# Patient Record
Sex: MALE | Race: WHITE | NOT HISPANIC OR LATINO | Employment: OTHER | URBAN - METROPOLITAN AREA
[De-identification: names, ages, dates, MRNs, and addresses within clinical notes are randomized per-mention and may not be internally consistent; named-entity substitution may affect disease eponyms.]

---

## 2017-01-28 ENCOUNTER — COMMUNICATION - HEALTHEAST (OUTPATIENT)
Dept: INTERNAL MEDICINE | Facility: CLINIC | Age: 67
End: 2017-01-28

## 2017-01-28 DIAGNOSIS — I10 HYPERTENSION: ICD-10-CM

## 2017-05-30 ENCOUNTER — OFFICE VISIT - HEALTHEAST (OUTPATIENT)
Dept: INTERNAL MEDICINE | Facility: CLINIC | Age: 67
End: 2017-05-30

## 2017-05-30 DIAGNOSIS — I10 HYPERTENSION: ICD-10-CM

## 2017-05-30 DIAGNOSIS — I10 ESSENTIAL HYPERTENSION: ICD-10-CM

## 2017-05-30 DIAGNOSIS — R73.03 PREDIABETES: ICD-10-CM

## 2017-05-30 DIAGNOSIS — E78.5 HLD (HYPERLIPIDEMIA): ICD-10-CM

## 2017-05-30 LAB
CHOLEST SERPL-MCNC: 110 MG/DL
FASTING STATUS PATIENT QL REPORTED: YES
HBA1C MFR BLD: 5.9 % (ref 3.5–6)
HDLC SERPL-MCNC: 35 MG/DL
LDLC SERPL CALC-MCNC: 47 MG/DL
TRIGL SERPL-MCNC: 138 MG/DL

## 2017-05-31 ENCOUNTER — COMMUNICATION - HEALTHEAST (OUTPATIENT)
Dept: INTERNAL MEDICINE | Facility: CLINIC | Age: 67
End: 2017-05-31

## 2017-06-02 ENCOUNTER — COMMUNICATION - HEALTHEAST (OUTPATIENT)
Dept: INTERNAL MEDICINE | Facility: CLINIC | Age: 67
End: 2017-06-02

## 2017-06-02 DIAGNOSIS — I10 HTN (HYPERTENSION): ICD-10-CM

## 2017-10-16 ENCOUNTER — AMBULATORY - HEALTHEAST (OUTPATIENT)
Dept: NURSING | Facility: CLINIC | Age: 67
End: 2017-10-16

## 2017-10-16 DIAGNOSIS — Z00.00 HEALTH CARE MAINTENANCE: ICD-10-CM

## 2018-04-03 ENCOUNTER — COMMUNICATION - HEALTHEAST (OUTPATIENT)
Dept: INTERNAL MEDICINE | Facility: CLINIC | Age: 68
End: 2018-04-03

## 2018-04-24 ENCOUNTER — OFFICE VISIT - HEALTHEAST (OUTPATIENT)
Dept: INTERNAL MEDICINE | Facility: CLINIC | Age: 68
End: 2018-04-24

## 2018-04-24 DIAGNOSIS — I10 HYPERTENSION: ICD-10-CM

## 2018-04-24 DIAGNOSIS — E78.5 HLD (HYPERLIPIDEMIA): ICD-10-CM

## 2018-04-24 DIAGNOSIS — R73.03 PREDIABETES: ICD-10-CM

## 2018-04-24 DIAGNOSIS — I10 HTN (HYPERTENSION): ICD-10-CM

## 2018-04-24 DIAGNOSIS — Z12.11 COLON CANCER SCREENING: ICD-10-CM

## 2018-04-24 DIAGNOSIS — I10 ESSENTIAL HYPERTENSION: ICD-10-CM

## 2018-04-24 LAB
ALBUMIN SERPL-MCNC: 3.7 G/DL (ref 3.5–5)
ALP SERPL-CCNC: 85 U/L (ref 45–120)
ALT SERPL W P-5'-P-CCNC: 34 U/L (ref 0–45)
ANION GAP SERPL CALCULATED.3IONS-SCNC: 13 MMOL/L (ref 5–18)
AST SERPL W P-5'-P-CCNC: 27 U/L (ref 0–40)
BILIRUB SERPL-MCNC: 1.4 MG/DL (ref 0–1)
BUN SERPL-MCNC: 15 MG/DL (ref 8–22)
CALCIUM SERPL-MCNC: 9.6 MG/DL (ref 8.5–10.5)
CHLORIDE BLD-SCNC: 103 MMOL/L (ref 98–107)
CHOLEST SERPL-MCNC: 88 MG/DL
CO2 SERPL-SCNC: 23 MMOL/L (ref 22–31)
CREAT SERPL-MCNC: 0.88 MG/DL (ref 0.7–1.3)
FASTING STATUS PATIENT QL REPORTED: ABNORMAL
GFR SERPL CREATININE-BSD FRML MDRD: >60 ML/MIN/1.73M2
GLUCOSE BLD-MCNC: 98 MG/DL (ref 70–125)
HBA1C MFR BLD: 6.2 % (ref 3.5–6)
HDLC SERPL-MCNC: 33 MG/DL
LDLC SERPL CALC-MCNC: 34 MG/DL
POTASSIUM BLD-SCNC: 4.3 MMOL/L (ref 3.5–5)
PROT SERPL-MCNC: 7.6 G/DL (ref 6–8)
SODIUM SERPL-SCNC: 139 MMOL/L (ref 136–145)
TRIGL SERPL-MCNC: 104 MG/DL

## 2018-04-25 ENCOUNTER — COMMUNICATION - HEALTHEAST (OUTPATIENT)
Dept: INTERNAL MEDICINE | Facility: CLINIC | Age: 68
End: 2018-04-25

## 2018-04-25 ENCOUNTER — AMBULATORY - HEALTHEAST (OUTPATIENT)
Dept: INTERNAL MEDICINE | Facility: CLINIC | Age: 68
End: 2018-04-25

## 2018-05-04 ENCOUNTER — AMBULATORY - HEALTHEAST (OUTPATIENT)
Dept: LAB | Facility: CLINIC | Age: 68
End: 2018-05-04

## 2018-05-04 DIAGNOSIS — Z12.11 COLON CANCER SCREENING: ICD-10-CM

## 2018-05-04 LAB
HEMOCCULT SP1 STL QL: NEGATIVE
HEMOCCULT SP2 STL QL: NEGATIVE
HEMOCCULT SP3 STL QL: NEGATIVE

## 2018-05-07 ENCOUNTER — COMMUNICATION - HEALTHEAST (OUTPATIENT)
Dept: INTERNAL MEDICINE | Facility: CLINIC | Age: 68
End: 2018-05-07

## 2018-05-18 ENCOUNTER — COMMUNICATION - HEALTHEAST (OUTPATIENT)
Dept: LAB | Facility: CLINIC | Age: 68
End: 2018-05-18

## 2018-11-08 ENCOUNTER — AMBULATORY - HEALTHEAST (OUTPATIENT)
Dept: NURSING | Facility: CLINIC | Age: 68
End: 2018-11-08

## 2018-11-08 ENCOUNTER — AMBULATORY - HEALTHEAST (OUTPATIENT)
Dept: INTERNAL MEDICINE | Facility: CLINIC | Age: 68
End: 2018-11-08

## 2018-11-08 DIAGNOSIS — Z23 FLU VACCINE NEED: ICD-10-CM

## 2019-04-18 ENCOUNTER — COMMUNICATION - HEALTHEAST (OUTPATIENT)
Dept: INTERNAL MEDICINE | Facility: CLINIC | Age: 69
End: 2019-04-18

## 2019-04-18 DIAGNOSIS — I10 ESSENTIAL HYPERTENSION: ICD-10-CM

## 2019-05-09 ENCOUNTER — COMMUNICATION - HEALTHEAST (OUTPATIENT)
Dept: INTERNAL MEDICINE | Facility: CLINIC | Age: 69
End: 2019-05-09

## 2019-05-09 DIAGNOSIS — E78.2 MIXED HYPERLIPIDEMIA: ICD-10-CM

## 2019-05-16 ENCOUNTER — OFFICE VISIT - HEALTHEAST (OUTPATIENT)
Dept: INTERNAL MEDICINE | Facility: CLINIC | Age: 69
End: 2019-05-16

## 2019-05-16 ENCOUNTER — COMMUNICATION - HEALTHEAST (OUTPATIENT)
Dept: INTERNAL MEDICINE | Facility: CLINIC | Age: 69
End: 2019-05-16

## 2019-05-16 DIAGNOSIS — R73.01 IMPAIRED FASTING GLUCOSE: ICD-10-CM

## 2019-05-16 DIAGNOSIS — E78.2 MIXED HYPERLIPIDEMIA: ICD-10-CM

## 2019-05-16 DIAGNOSIS — I10 ESSENTIAL HYPERTENSION: ICD-10-CM

## 2019-05-16 DIAGNOSIS — Z12.11 SCREEN FOR COLON CANCER: ICD-10-CM

## 2019-05-16 LAB
ALBUMIN SERPL-MCNC: 4 G/DL (ref 3.5–5)
ALP SERPL-CCNC: 84 U/L (ref 45–120)
ALT SERPL W P-5'-P-CCNC: 24 U/L (ref 0–45)
ANION GAP SERPL CALCULATED.3IONS-SCNC: 13 MMOL/L (ref 5–18)
AST SERPL W P-5'-P-CCNC: 23 U/L (ref 0–40)
BILIRUB SERPL-MCNC: 1.6 MG/DL (ref 0–1)
BUN SERPL-MCNC: 14 MG/DL (ref 8–22)
CALCIUM SERPL-MCNC: 9.8 MG/DL (ref 8.5–10.5)
CHLORIDE BLD-SCNC: 103 MMOL/L (ref 98–107)
CHOLEST SERPL-MCNC: 117 MG/DL
CO2 SERPL-SCNC: 24 MMOL/L (ref 22–31)
CREAT SERPL-MCNC: 1.02 MG/DL (ref 0.7–1.3)
FASTING STATUS PATIENT QL REPORTED: YES
GFR SERPL CREATININE-BSD FRML MDRD: >60 ML/MIN/1.73M2
GLUCOSE BLD-MCNC: 110 MG/DL (ref 70–125)
HBA1C MFR BLD: 6.1 % (ref 3.5–6)
HDLC SERPL-MCNC: 40 MG/DL
LDLC SERPL CALC-MCNC: 51 MG/DL
POTASSIUM BLD-SCNC: 4.3 MMOL/L (ref 3.5–5)
PROT SERPL-MCNC: 7.8 G/DL (ref 6–8)
SODIUM SERPL-SCNC: 140 MMOL/L (ref 136–145)
TRIGL SERPL-MCNC: 132 MG/DL

## 2019-05-16 ASSESSMENT — MIFFLIN-ST. JEOR: SCORE: 1648.04

## 2019-05-20 ENCOUNTER — AMBULATORY - HEALTHEAST (OUTPATIENT)
Dept: LAB | Facility: CLINIC | Age: 69
End: 2019-05-20

## 2019-05-20 DIAGNOSIS — Z12.11 SCREEN FOR COLON CANCER: ICD-10-CM

## 2019-05-20 LAB
HEMOCCULT SP1 STL QL: NEGATIVE
HEMOCCULT SP2 STL QL: NEGATIVE
HEMOCCULT SP3 STL QL: NEGATIVE

## 2019-05-21 ENCOUNTER — COMMUNICATION - HEALTHEAST (OUTPATIENT)
Dept: INTERNAL MEDICINE | Facility: CLINIC | Age: 69
End: 2019-05-21

## 2019-08-19 ENCOUNTER — OFFICE VISIT - HEALTHEAST (OUTPATIENT)
Dept: FAMILY MEDICINE | Facility: CLINIC | Age: 69
End: 2019-08-19

## 2019-08-19 DIAGNOSIS — R35.0 URINARY FREQUENCY: ICD-10-CM

## 2019-08-19 DIAGNOSIS — D72.829 LEUKOCYTOSIS, UNSPECIFIED TYPE: ICD-10-CM

## 2019-08-19 DIAGNOSIS — R73.03 PREDIABETES: ICD-10-CM

## 2019-08-19 DIAGNOSIS — R05.9 COUGH: ICD-10-CM

## 2019-08-19 DIAGNOSIS — R53.83 FATIGUE, UNSPECIFIED TYPE: ICD-10-CM

## 2019-08-19 LAB
ALBUMIN UR-MCNC: NEGATIVE MG/DL
ANION GAP SERPL CALCULATED.3IONS-SCNC: 11 MMOL/L (ref 5–18)
APPEARANCE UR: CLEAR
BASOPHILS # BLD AUTO: 0.1 THOU/UL (ref 0–0.2)
BASOPHILS NFR BLD AUTO: 1 % (ref 0–2)
BILIRUB UR QL STRIP: NEGATIVE
BUN SERPL-MCNC: 21 MG/DL (ref 8–22)
CHLORIDE BLD-SCNC: 100 MMOL/L (ref 98–107)
CO2 SERPL-SCNC: 26 MMOL/L (ref 22–31)
COLOR UR AUTO: YELLOW
CREAT SERPL-MCNC: 1.1 MG/DL (ref 0.8–1.5)
EOSINOPHIL # BLD AUTO: 0.7 THOU/UL (ref 0–0.4)
EOSINOPHIL NFR BLD AUTO: 5 % (ref 0–6)
ERYTHROCYTE [DISTWIDTH] IN BLOOD BY AUTOMATED COUNT: 10.6 % (ref 11–14.5)
GLUCOSE BLD-MCNC: 164 MG/DL (ref 70–125)
GLUCOSE UR STRIP-MCNC: NEGATIVE MG/DL
HBA1C MFR BLD: 6.2 % (ref 3.5–6)
HCT VFR BLD AUTO: 39.4 % (ref 40–54)
HGB BLD-MCNC: 13.4 G/DL (ref 14–18)
HGB UR QL STRIP: NEGATIVE
KETONES UR STRIP-MCNC: NEGATIVE MG/DL
LEUKOCYTE ESTERASE UR QL STRIP: NEGATIVE
LYMPHOCYTES # BLD AUTO: 2.4 THOU/UL (ref 0.8–4.4)
LYMPHOCYTES NFR BLD AUTO: 17 % (ref 20–40)
MCH RBC QN AUTO: 34.2 PG (ref 27–34)
MCHC RBC AUTO-ENTMCNC: 33.9 G/DL (ref 32–36)
MCV RBC AUTO: 101 FL (ref 80–100)
MONOCYTES # BLD AUTO: 1.3 THOU/UL (ref 0–0.9)
MONOCYTES NFR BLD AUTO: 9 % (ref 2–10)
NEUTROPHILS # BLD AUTO: 9.5 THOU/UL (ref 2–7.7)
NEUTROPHILS NFR BLD AUTO: 68 % (ref 50–70)
NITRATE UR QL: NEGATIVE
PH UR STRIP: 5.5 [PH] (ref 5–8)
PLATELET # BLD AUTO: 614 THOU/UL (ref 140–440)
PMV BLD AUTO: 7.7 FL (ref 7–10)
POTASSIUM BLD-SCNC: 4.4 MMOL/L (ref 3.5–5.5)
RBC # BLD AUTO: 3.91 MILL/UL (ref 4.4–6.2)
SODIUM SERPL-SCNC: 137 MMOL/L (ref 136–145)
SP GR UR STRIP: 1.02 (ref 1–1.03)
UROBILINOGEN UR STRIP-ACNC: NORMAL
WBC: 14 THOU/UL (ref 4–11)

## 2019-08-21 ENCOUNTER — OFFICE VISIT - HEALTHEAST (OUTPATIENT)
Dept: INTERNAL MEDICINE | Facility: CLINIC | Age: 69
End: 2019-08-21

## 2019-08-21 DIAGNOSIS — R53.83 FATIGUE, UNSPECIFIED TYPE: ICD-10-CM

## 2019-08-21 DIAGNOSIS — R05.9 COUGH: ICD-10-CM

## 2019-08-21 DIAGNOSIS — D72.829 LEUKOCYTOSIS, UNSPECIFIED TYPE: ICD-10-CM

## 2019-08-21 LAB
BASOPHILS # BLD AUTO: 0.1 THOU/UL (ref 0–0.2)
BASOPHILS NFR BLD AUTO: 1 % (ref 0–2)
EOSINOPHIL # BLD AUTO: 0.6 THOU/UL (ref 0–0.4)
EOSINOPHIL NFR BLD AUTO: 5 % (ref 0–6)
ERYTHROCYTE [DISTWIDTH] IN BLOOD BY AUTOMATED COUNT: 10.6 % (ref 11–14.5)
FOLATE SERPL-MCNC: 16.1 NG/ML
HCT VFR BLD AUTO: 38.7 % (ref 40–54)
HGB BLD-MCNC: 13.1 G/DL (ref 14–18)
LYMPHOCYTES # BLD AUTO: 3.2 THOU/UL (ref 0.8–4.4)
LYMPHOCYTES NFR BLD AUTO: 25 % (ref 20–40)
MCH RBC QN AUTO: 34 PG (ref 27–34)
MCHC RBC AUTO-ENTMCNC: 33.7 G/DL (ref 32–36)
MCV RBC AUTO: 101 FL (ref 80–100)
MONOCYTES # BLD AUTO: 1.1 THOU/UL (ref 0–0.9)
MONOCYTES NFR BLD AUTO: 9 % (ref 2–10)
NEUTROPHILS # BLD AUTO: 7.6 THOU/UL (ref 2–7.7)
NEUTROPHILS NFR BLD AUTO: 61 % (ref 50–70)
PLATELET # BLD AUTO: 685 THOU/UL (ref 140–440)
PMV BLD AUTO: 7 FL (ref 7–10)
RBC # BLD AUTO: 3.84 MILL/UL (ref 4.4–6.2)
VIT B12 SERPL-MCNC: 761 PG/ML (ref 213–816)
WBC: 12.5 THOU/UL (ref 4–11)

## 2019-08-22 ENCOUNTER — AMBULATORY - HEALTHEAST (OUTPATIENT)
Dept: INTERNAL MEDICINE | Facility: CLINIC | Age: 69
End: 2019-08-22

## 2019-08-22 DIAGNOSIS — D75.839 THROMBOCYTOSIS: ICD-10-CM

## 2019-08-22 DIAGNOSIS — D64.9 ANEMIA, UNSPECIFIED TYPE: ICD-10-CM

## 2019-08-28 ENCOUNTER — COMMUNICATION - HEALTHEAST (OUTPATIENT)
Dept: INTERNAL MEDICINE | Facility: CLINIC | Age: 69
End: 2019-08-28

## 2019-09-05 ENCOUNTER — AMBULATORY - HEALTHEAST (OUTPATIENT)
Dept: LAB | Facility: CLINIC | Age: 69
End: 2019-09-05

## 2019-09-05 DIAGNOSIS — D72.829 LEUKOCYTOSIS, UNSPECIFIED TYPE: ICD-10-CM

## 2019-09-05 DIAGNOSIS — D64.9 ANEMIA, UNSPECIFIED TYPE: ICD-10-CM

## 2019-09-05 DIAGNOSIS — D75.839 THROMBOCYTOSIS: ICD-10-CM

## 2019-09-05 LAB
BASOPHILS # BLD AUTO: 0.1 THOU/UL (ref 0–0.2)
BASOPHILS NFR BLD AUTO: 1 % (ref 0–2)
EOSINOPHIL # BLD AUTO: 0.3 THOU/UL (ref 0–0.4)
EOSINOPHIL NFR BLD AUTO: 3 % (ref 0–6)
ERYTHROCYTE [DISTWIDTH] IN BLOOD BY AUTOMATED COUNT: 13 % (ref 11–14.5)
HCT VFR BLD AUTO: 41.7 % (ref 40–54)
HGB BLD-MCNC: 13.3 G/DL (ref 14–18)
LYMPHOCYTES # BLD AUTO: 2.9 THOU/UL (ref 0.8–4.4)
LYMPHOCYTES NFR BLD AUTO: 24 % (ref 20–40)
MCH RBC QN AUTO: 33.3 PG (ref 27–34)
MCHC RBC AUTO-ENTMCNC: 31.9 G/DL (ref 32–36)
MCV RBC AUTO: 105 FL (ref 80–100)
MONOCYTES # BLD AUTO: 1.2 THOU/UL (ref 0–0.9)
MONOCYTES NFR BLD AUTO: 10 % (ref 2–10)
NEUTROPHILS # BLD AUTO: 7.2 THOU/UL (ref 2–7.7)
NEUTROPHILS NFR BLD AUTO: 61 % (ref 50–70)
PLATELET # BLD AUTO: 469 THOU/UL (ref 140–440)
PMV BLD AUTO: 10.5 FL (ref 8.5–12.5)
RBC # BLD AUTO: 3.99 MILL/UL (ref 4.4–6.2)
WBC: 11.7 THOU/UL (ref 4–11)

## 2019-09-06 LAB
BASOPHILS # BLD AUTO: 0.1 THOU/UL (ref 0–0.2)
BASOPHILS NFR BLD AUTO: 1 % (ref 0–2)
EOSINOPHIL # BLD AUTO: 0.3 THOU/UL (ref 0–0.4)
EOSINOPHIL NFR BLD AUTO: 3 % (ref 0–6)
ERYTHROCYTE [DISTWIDTH] IN BLOOD BY AUTOMATED COUNT: 12.9 % (ref 11–14.5)
HCT VFR BLD AUTO: 41.6 % (ref 40–54)
HGB BLD-MCNC: 13.4 G/DL (ref 14–18)
LAB AP CHARGES (HE HISTORICAL CONVERSION): NORMAL
LYMPHOCYTES # BLD AUTO: 2.8 THOU/UL (ref 0.8–4.4)
LYMPHOCYTES NFR BLD AUTO: 25 % (ref 20–40)
MCH RBC QN AUTO: 33.7 PG (ref 27–34)
MCHC RBC AUTO-ENTMCNC: 32.2 G/DL (ref 32–36)
MCV RBC AUTO: 105 FL (ref 80–100)
MONOCYTES # BLD AUTO: 1.1 THOU/UL (ref 0–0.9)
MONOCYTES NFR BLD AUTO: 10 % (ref 2–10)
NEUTROPHILS # BLD AUTO: 7.1 THOU/UL (ref 2–7.7)
NEUTROPHILS NFR BLD AUTO: 62 % (ref 50–70)
PATH REPORT.COMMENTS IMP SPEC: NORMAL
PATH REPORT.COMMENTS IMP SPEC: NORMAL
PATH REPORT.FINAL DX SPEC: NORMAL
PATH REPORT.MICROSCOPIC SPEC OTHER STN: ABNORMAL
PATH REPORT.MICROSCOPIC SPEC OTHER STN: NORMAL
PATH REPORT.RELEVANT HX SPEC: NORMAL
PLATELET # BLD AUTO: 474 THOU/UL (ref 140–440)
PMV BLD AUTO: 10.4 FL (ref 8.5–12.5)
RBC # BLD AUTO: 3.98 MILL/UL (ref 4.4–6.2)
WBC: 11.4 THOU/UL (ref 4–11)

## 2019-09-11 ENCOUNTER — COMMUNICATION - HEALTHEAST (OUTPATIENT)
Dept: INTERNAL MEDICINE | Facility: CLINIC | Age: 69
End: 2019-09-11

## 2019-10-09 ENCOUNTER — AMBULATORY - HEALTHEAST (OUTPATIENT)
Dept: INFUSION THERAPY | Facility: HOSPITAL | Age: 69
End: 2019-10-09

## 2019-10-09 ENCOUNTER — OFFICE VISIT - HEALTHEAST (OUTPATIENT)
Dept: ONCOLOGY | Facility: HOSPITAL | Age: 69
End: 2019-10-09

## 2019-10-09 DIAGNOSIS — D75.839 THROMBOCYTOSIS: ICD-10-CM

## 2019-10-09 DIAGNOSIS — D64.9 ANEMIA, UNSPECIFIED TYPE: ICD-10-CM

## 2019-10-09 DIAGNOSIS — D72.821 MONOCYTOSIS: ICD-10-CM

## 2019-10-09 DIAGNOSIS — D72.829 LEUKOCYTOSIS, UNSPECIFIED TYPE: ICD-10-CM

## 2019-10-09 LAB
ALBUMIN SERPL-MCNC: 3.9 G/DL (ref 3.5–5)
ALP SERPL-CCNC: 78 U/L (ref 45–120)
ALT SERPL W P-5'-P-CCNC: 21 U/L (ref 0–45)
ANION GAP SERPL CALCULATED.3IONS-SCNC: 9 MMOL/L (ref 5–18)
AST SERPL W P-5'-P-CCNC: 23 U/L (ref 0–40)
BASOPHILS # BLD AUTO: 0.1 THOU/UL (ref 0–0.2)
BASOPHILS NFR BLD AUTO: 1 % (ref 0–2)
BILIRUB SERPL-MCNC: 1.2 MG/DL (ref 0–1)
BUN SERPL-MCNC: 13 MG/DL (ref 8–22)
CALCIUM SERPL-MCNC: 9.8 MG/DL (ref 8.5–10.5)
CHLORIDE BLD-SCNC: 102 MMOL/L (ref 98–107)
CO2 SERPL-SCNC: 29 MMOL/L (ref 22–31)
CREAT SERPL-MCNC: 1.11 MG/DL (ref 0.7–1.3)
EOSINOPHIL # BLD AUTO: 0.3 THOU/UL (ref 0–0.4)
EOSINOPHIL NFR BLD AUTO: 3 % (ref 0–6)
ERYTHROCYTE [DISTWIDTH] IN BLOOD BY AUTOMATED COUNT: 13.2 % (ref 11–14.5)
GFR SERPL CREATININE-BSD FRML MDRD: >60 ML/MIN/1.73M2
GLUCOSE BLD-MCNC: 95 MG/DL (ref 70–125)
HCT VFR BLD AUTO: 42.2 % (ref 40–54)
HGB BLD-MCNC: 14.1 G/DL (ref 14–18)
LYMPHOCYTES # BLD AUTO: 2.8 THOU/UL (ref 0.8–4.4)
LYMPHOCYTES NFR BLD AUTO: 26 % (ref 20–40)
MCH RBC QN AUTO: 34 PG (ref 27–34)
MCHC RBC AUTO-ENTMCNC: 33.4 G/DL (ref 32–36)
MCV RBC AUTO: 102 FL (ref 80–100)
MONOCYTES # BLD AUTO: 1.1 THOU/UL (ref 0–0.9)
MONOCYTES NFR BLD AUTO: 10 % (ref 2–10)
NEUTROPHILS # BLD AUTO: 6.5 THOU/UL (ref 2–7.7)
NEUTROPHILS NFR BLD AUTO: 60 % (ref 50–70)
PLATELET # BLD AUTO: 436 THOU/UL (ref 140–440)
PMV BLD AUTO: 9.5 FL (ref 8.5–12.5)
POTASSIUM BLD-SCNC: 4.2 MMOL/L (ref 3.5–5)
PROT SERPL-MCNC: 8.2 G/DL (ref 6–8)
RBC # BLD AUTO: 4.15 MILL/UL (ref 4.4–6.2)
SODIUM SERPL-SCNC: 140 MMOL/L (ref 136–145)
T4 FREE SERPL-MCNC: 0.8 NG/DL (ref 0.7–1.8)
TSH SERPL DL<=0.005 MIU/L-ACNC: 1.32 UIU/ML (ref 0.3–5)
WBC: 10.7 THOU/UL (ref 4–11)

## 2019-11-12 ENCOUNTER — AMBULATORY - HEALTHEAST (OUTPATIENT)
Dept: NURSING | Facility: CLINIC | Age: 69
End: 2019-11-12

## 2020-05-10 ENCOUNTER — COMMUNICATION - HEALTHEAST (OUTPATIENT)
Dept: INTERNAL MEDICINE | Facility: CLINIC | Age: 70
End: 2020-05-10

## 2020-05-10 DIAGNOSIS — I10 ESSENTIAL HYPERTENSION: ICD-10-CM

## 2020-07-13 ENCOUNTER — COMMUNICATION - HEALTHEAST (OUTPATIENT)
Dept: INTERNAL MEDICINE | Facility: CLINIC | Age: 70
End: 2020-07-13

## 2020-07-13 DIAGNOSIS — I10 ESSENTIAL HYPERTENSION: ICD-10-CM

## 2020-08-07 ENCOUNTER — COMMUNICATION - HEALTHEAST (OUTPATIENT)
Dept: INTERNAL MEDICINE | Facility: CLINIC | Age: 70
End: 2020-08-07

## 2020-08-07 DIAGNOSIS — I10 ESSENTIAL HYPERTENSION: ICD-10-CM

## 2020-08-07 DIAGNOSIS — E78.2 MIXED HYPERLIPIDEMIA: ICD-10-CM

## 2020-10-09 ENCOUNTER — COMMUNICATION - HEALTHEAST (OUTPATIENT)
Dept: INTERNAL MEDICINE | Facility: CLINIC | Age: 70
End: 2020-10-09

## 2020-10-09 DIAGNOSIS — I10 ESSENTIAL HYPERTENSION: ICD-10-CM

## 2020-10-20 ENCOUNTER — OFFICE VISIT - HEALTHEAST (OUTPATIENT)
Dept: INTERNAL MEDICINE | Facility: CLINIC | Age: 70
End: 2020-10-20

## 2020-10-20 DIAGNOSIS — E66.01 MORBID OBESITY (H): ICD-10-CM

## 2020-10-20 DIAGNOSIS — E78.2 MIXED HYPERLIPIDEMIA: ICD-10-CM

## 2020-10-20 DIAGNOSIS — R73.03 PREDIABETES: ICD-10-CM

## 2020-10-20 DIAGNOSIS — I10 ESSENTIAL HYPERTENSION: ICD-10-CM

## 2020-10-20 DIAGNOSIS — F70 MILD INTELLECTUAL DISABILITIES: ICD-10-CM

## 2020-10-20 LAB
ALT SERPL W P-5'-P-CCNC: 27 U/L (ref 0–45)
ANION GAP SERPL CALCULATED.3IONS-SCNC: 10 MMOL/L (ref 5–18)
BUN SERPL-MCNC: 14 MG/DL (ref 8–28)
CALCIUM SERPL-MCNC: 10 MG/DL (ref 8.5–10.5)
CHLORIDE BLD-SCNC: 102 MMOL/L (ref 98–107)
CHOLEST SERPL-MCNC: 134 MG/DL
CO2 SERPL-SCNC: 27 MMOL/L (ref 22–31)
CREAT SERPL-MCNC: 1.21 MG/DL (ref 0.7–1.3)
ERYTHROCYTE [DISTWIDTH] IN BLOOD BY AUTOMATED COUNT: 10 % (ref 11–14.5)
FASTING STATUS PATIENT QL REPORTED: NO
GFR SERPL CREATININE-BSD FRML MDRD: 59 ML/MIN/1.73M2
GLUCOSE BLD-MCNC: 128 MG/DL (ref 70–125)
HBA1C MFR BLD: 6.1 %
HCT VFR BLD AUTO: 45.2 % (ref 40–54)
HDLC SERPL-MCNC: 45 MG/DL
HGB BLD-MCNC: 15.3 G/DL (ref 14–18)
LDLC SERPL CALC-MCNC: 52 MG/DL
MCH RBC QN AUTO: 34.5 PG (ref 27–34)
MCHC RBC AUTO-ENTMCNC: 33.9 G/DL (ref 32–36)
MCV RBC AUTO: 102 FL (ref 80–100)
PLATELET # BLD AUTO: 368 THOU/UL (ref 140–440)
PMV BLD AUTO: 7.8 FL (ref 7–10)
POTASSIUM BLD-SCNC: 4.7 MMOL/L (ref 3.5–5)
RBC # BLD AUTO: 4.44 MILL/UL (ref 4.4–6.2)
SODIUM SERPL-SCNC: 139 MMOL/L (ref 136–145)
TRIGL SERPL-MCNC: 187 MG/DL
WBC: 11.4 THOU/UL (ref 4–11)

## 2020-10-20 ASSESSMENT — MIFFLIN-ST. JEOR: SCORE: 1657.11

## 2020-10-21 ENCOUNTER — COMMUNICATION - HEALTHEAST (OUTPATIENT)
Dept: INTERNAL MEDICINE | Facility: CLINIC | Age: 70
End: 2020-10-21

## 2020-11-10 ENCOUNTER — COMMUNICATION - HEALTHEAST (OUTPATIENT)
Dept: INTERNAL MEDICINE | Facility: CLINIC | Age: 70
End: 2020-11-10

## 2020-11-10 DIAGNOSIS — I10 ESSENTIAL HYPERTENSION: ICD-10-CM

## 2021-04-22 ENCOUNTER — COMMUNICATION - HEALTHEAST (OUTPATIENT)
Dept: INTERNAL MEDICINE | Facility: CLINIC | Age: 71
End: 2021-04-22

## 2021-04-22 ENCOUNTER — OFFICE VISIT - HEALTHEAST (OUTPATIENT)
Dept: INTERNAL MEDICINE | Facility: CLINIC | Age: 71
End: 2021-04-22

## 2021-04-22 DIAGNOSIS — D75.839 THROMBOCYTOSIS: ICD-10-CM

## 2021-04-22 DIAGNOSIS — I10 ESSENTIAL HYPERTENSION: ICD-10-CM

## 2021-04-22 DIAGNOSIS — E78.2 MIXED HYPERLIPIDEMIA: ICD-10-CM

## 2021-04-22 DIAGNOSIS — B35.1 ONYCHOMYCOSIS: ICD-10-CM

## 2021-04-22 DIAGNOSIS — E66.01 MORBID OBESITY (H): ICD-10-CM

## 2021-04-22 DIAGNOSIS — R73.03 PREDIABETES: ICD-10-CM

## 2021-04-22 LAB
ERYTHROCYTE [DISTWIDTH] IN BLOOD BY AUTOMATED COUNT: 12.5 % (ref 11–14.5)
HBA1C MFR BLD: 6.3 %
HCT VFR BLD AUTO: 42.4 % (ref 40–54)
HGB BLD-MCNC: 14.5 G/DL (ref 14–18)
MCH RBC QN AUTO: 34.2 PG (ref 27–34)
MCHC RBC AUTO-ENTMCNC: 34.2 G/DL (ref 32–36)
MCV RBC AUTO: 100 FL (ref 80–100)
PLATELET # BLD AUTO: 375 THOU/UL (ref 140–440)
PMV BLD AUTO: 9.8 FL (ref 7–10)
RBC # BLD AUTO: 4.24 MILL/UL (ref 4.4–6.2)
WBC: 11.7 THOU/UL (ref 4–11)

## 2021-04-22 RX ORDER — LISINOPRIL 40 MG/1
TABLET ORAL
Qty: 90 TABLET | Refills: 1 | Status: SHIPPED | OUTPATIENT
Start: 2021-04-22 | End: 2021-11-02

## 2021-04-22 RX ORDER — ATORVASTATIN CALCIUM 20 MG/1
20 TABLET, FILM COATED ORAL DAILY
Qty: 90 TABLET | Refills: 1 | Status: SHIPPED | OUTPATIENT
Start: 2021-04-22 | End: 2021-11-02

## 2021-04-22 RX ORDER — METOPROLOL SUCCINATE 100 MG/1
100 TABLET, EXTENDED RELEASE ORAL DAILY
Qty: 90 TABLET | Refills: 1 | Status: SHIPPED | OUTPATIENT
Start: 2021-04-22 | End: 2021-11-02

## 2021-05-28 NOTE — PROGRESS NOTES
Internal Medicine Office Visit  [facility]  Patient Name: Oumar Dallas  Patient Age: 69 y.o.  YOB: 1950  MRN: [id number]    Date of Visit: 2019  Reason for Office Visit:   Chief Complaint   Patient presents with     Follow-up           Assessment / Plan / Medical Decision Makin. Essential hypertension  2. BMI 35.0-35.9,adult  - stable with current medications   - Encouraged 30 minutes of activity per day such as walking or marching in place and less snacking to encourage weight loss   - metoprolol succinate (TOPROL-XL) 100 MG 24 hr tablet; Take 1 tablet (100 mg total) by mouth daily.  Dispense: 90 tablet; Refill: 3  - Comprehensive Metabolic Panel  - lisinopril (PRINIVIL,ZESTRIL) 40 MG tablet; TAKE ONE TABLET BY MOUTH DAILY  Dispense: 90 tablet; Refill: 3    3. Screen for colon cancer  - Occult Blood(ICT); Future    4. Mixed hyperlipidemia  - repeat lipids today, he is fasting   - Lipid Profile  - atorvastatin (LIPITOR) 20 MG tablet; Take 1 tablet (20 mg total) by mouth daily.  Dispense: 90 tablet; Refill: 3    5. Impaired fasting glucose  - Glycosylated Hemoglobin A1c        Health Maintenance Review  Health Maintenance   Topic Date Due     ZOSTER VACCINES (1 of 2) 2000     FALL RISK ASSESSMENT  2017     FECAL OCCULT BLOOD/FIT ANNUAL COLON CANCER SCREENING  2019     ADVANCE DIRECTIVES DISCUSSED WITH PATIENT  2021     TD 18+ HE  2022     PNEUMOCOCCAL POLYSACCHARIDE VACCINE AGE 65 AND OVER  Completed     INFLUENZA VACCINE RULE BASED  Completed     PNEUMOCOCCAL CONJUGATE VACCINE FOR ADULTS (PCV13 OR PREVNAR)  Completed         I have changed Oumar Dallas's atorvastatin. I am also having him maintain his metoprolol succinate and lisinopril.      HPI:  Oumar Dallas is a 69 y.o. year old who presents to the office today for follow up. He does not have any new concerns today.     We reviewed his hypertension.  Blood  pressure has been well controlled without lightheadedness or dizziness associated with his treatment.  He does a stretching routine in the morning but does not have a regular cardio exercise plan.  We discussed walking or marching in place to help to control his weight.  He also admits to snacking throughout the day on things like cereal.  Encouraged more fruits and vegetables and less snacking.    Hyperlipidemia was reviewed.  He continues atorvastatin 20 mg daily, he is not expensing any myalgias associated with this medication.    Colon cancer screening options were reviewed.  He has previously done an occult stool test and wishes to proceed with this type of screening.  He is not interested in colonoscopy.    Review of Systems- pertinent positive in bold:  Constitutional: Fever, chills, night sweats, fainting, weight change, fatigue, seizures, dizziness, sleeping difficulties, loud snoring/pauses in breathing  Eyes: change in vision, blurred or double vision, redness/eye pain  Ears, nose, mouth, throat: change in hearing, ear pain, hoarseness, difficulty swallowing, sores in the mouth or throat  Respiratory: shortness of breath, cough, bloody sputum, wheezing  Cardiovascular: chest pain, palpitations   Gastrointestinal: abdominal pain, heartburn/indigestion, nausea/vomiting, change in appetite, change in bowel habits, constipation or diarrhea, rectal bleeding/dark stools, difficulty swallowing  Urinary: painful urination, frequent urination, urinary urgency/incontinence, blood in urine/dark urine, nocturia  Musculoskeletal: backache/back pain (new or increasing), weakness, joint pain/stiffness (new or increasing), muscle cramps, swelling of hands, feet, ankles, leg pain/redness  Skin: change in moles/freckles, rash, nodules  Hematologic/lymphatic: swollen lymph glands, abnormal bruising/bleeding  Endocrine: excessive thirst/urination, cold or heat intolerance  Breast: breast lump, breast pain, nipple  "discharge/skin changes  Neurologic/emotional: worrisome memory change, numbness/tingling, anxiety, mood swings      Current Scheduled Meds:  Outpatient Encounter Medications as of 5/16/2019   Medication Sig Dispense Refill     atorvastatin (LIPITOR) 20 MG tablet Take 1 tablet (20 mg total) by mouth daily. 90 tablet 3     lisinopril (PRINIVIL,ZESTRIL) 40 MG tablet TAKE ONE TABLET BY MOUTH DAILY 90 tablet 3     [DISCONTINUED] atorvastatin (LIPITOR) 20 MG tablet TAKE 1 TABLET BY MOUTH DAILY 90 tablet 0     [DISCONTINUED] lisinopril (PRINIVIL,ZESTRIL) 40 MG tablet TAKE ONE TABLET BY MOUTH DAILY 90 tablet 3     [DISCONTINUED] lisinopril (PRINIVIL,ZESTRIL) 40 MG tablet TAKE ONE TABLET BY MOUTH ONCE DAILY 90 tablet 0     [DISCONTINUED] metoprolol succinate (TOPROL-XL) 100 MG 24 hr tablet Take 1 tablet (100 mg total) by mouth daily. 90 tablet 3     metoprolol succinate (TOPROL-XL) 100 MG 24 hr tablet Take 1 tablet (100 mg total) by mouth daily. 90 tablet 3     No facility-administered encounter medications on file as of 5/16/2019.      History reviewed. No pertinent past medical history.  History reviewed. No pertinent surgical history.  Social History     Tobacco Use     Smoking status: Never Smoker   Substance Use Topics     Alcohol use: No     Drug use: No       Objective / Physical Examination:  Vitals:    05/16/19 0914   BP: 118/70   Pulse: 82   Weight: 213 lb (96.6 kg)   Height: 5' 5\" (1.651 m)     Wt Readings from Last 3 Encounters:   05/16/19 213 lb (96.6 kg)   04/24/18 213 lb (96.6 kg)   05/30/17 211 lb 6.4 oz (95.9 kg)     Body mass index is 35.45 kg/m .     General Appearance: Alert and oriented, cooperative, affect appropriate, speech clear, in no apparent distress  Neck: Supple, trachea midline. No carotid bruits   Lungs: Clear to auscultation bilaterally. Normal inspiratory and expiratory effort  Cardiovascular: Regular rate, normal S1, S2. No murmurs, rubs, or gallops  Extremities: No edema      Orders Placed " This Encounter   Procedures     Occult Blood(ICT)     Comprehensive Metabolic Panel     Glycosylated Hemoglobin A1c     Lipid Profile   Followup: Return in about 6 months (around 11/16/2019) for Recheck. earlier if needed.        Tamika Sauceda, CNP

## 2021-05-28 NOTE — TELEPHONE ENCOUNTER
Call patient/patient's mom. He is due for an annual follow up appointment and lab work. Please schedule.

## 2021-05-28 NOTE — PATIENT INSTRUCTIONS - HE
Try to exercise more such as walking. You should get 30 minutes of exercise per day  Limit snacking     The new shingles shot (Shingrix) is 2 separate shots  by 2-6 months.    The cost out of pocket is around $390 for the 2 shots, so you might want to see if your insurance covers it or a portion of it prior to having it done.  Often by having it done at a pharmacy rather than a clinic, it can be cheaper for you. I recommend that you check on the cost through your insurance or talk to your pharmacist about the cost of the vaccine.     It is estimated that any person's risk of shingles over their lifetime is around 10-20%.    The old shingles vaccine (Zostavax) is about 50% effective, reducing your risk of shingles to about 5-10% over your lifetime.    The new shot is 90% effective, reducing your risk of shingles to about 1-2% over your lifetime.    Because the shot is strong, it is very common to have flu like symptoms for 2-3 days after the shot.  25-50% of patients will have fever, muscle aches or headache.

## 2021-05-28 NOTE — TELEPHONE ENCOUNTER
RN cannot approve Refill Request    RN can NOT refill this medication PCP messaged that patient is overdue for Office Visit.       Maria Luisa Harrison, [facility] Triage/Med Refill 5/9/2019    Requested Prescriptions   Pending Prescriptions Disp Refills     atorvastatin (LIPITOR) 20 MG tablet [Pharmacy Med Name: ATORVASTATIN 20 MG TABLET] 90 tablet 0     Sig: TAKE 1 TABLET BY MOUTH DAILY       Statins Refill Protocol (Hmg CoA Reductase Inhibitors) Failed - 5/9/2019  9:11 AM        Failed - PCP or prescribing provider visit in past 12 months      Last office visit with prescriber/PCP: 4/24/2018 Tamika Sauceda FNP OR same dept: Visit date not found OR same specialty: 4/24/2018 Tamika Sauceda FNP  Last physical: Visit date not found Last MTM visit: Visit date not found   Next visit within 3 mo: Visit date not found  Next physical within 3 mo: Visit date not found  Prescriber OR PCP: TATIANA Austin  Last diagnosis associated with med order: There are no diagnoses linked to this encounter.  If protocol passes may refill for 12 months if within 3 months of last provider visit (or a total of 15 months).

## 2021-05-28 NOTE — TELEPHONE ENCOUNTER
Due to be seen and labs    Rx renewed per Medication Renewal Policy. Medication was last renewed on 4/24/18.    Maria Luisa Harrison, [facility] Triage/Med Refill 4/19/2019     Requested Prescriptions   Pending Prescriptions Disp Refills     lisinopril (PRINIVIL,ZESTRIL) 40 MG tablet [Pharmacy Med Name: LISINOPRIL 40MG TABLET] 90 tablet 0     Sig: TAKE ONE TABLET BY MOUTH ONCE DAILY       Ace Inhibitors Refill Protocol Passed - 4/18/2019  8:16 AM        Passed - PCP or prescribing provider visit in past 12 months       Last office visit with prescriber/PCP: 4/24/2018 Tamika Sauceda FNP OR same dept: 4/24/2018 Tamika Sauceda FNP OR same specialty: 4/24/2018 Tamika Sauceda FNP  Last physical: Visit date not found Last MTM visit: Visit date not found   Next visit within 3 mo: Visit date not found  Next physical within 3 mo: Visit date not found  Prescriber OR PCP: TATIANA Austin  Last diagnosis associated with med order: There are no diagnoses linked to this encounter.  If protocol passes may refill for 12 months if within 3 months of last provider visit (or a total of 15 months).             Passed - Serum Potassium in past 12 months     Lab Results   Component Value Date    Potassium 4.3 04/24/2018             Passed - Blood pressure filed in past 12 months     BP Readings from Last 1 Encounters:   04/24/18 124/74             Passed - Serum Creatinine in past 12 months     Creatinine   Date Value Ref Range Status   04/24/2018 0.88 0.70 - 1.30 mg/dL Final

## 2021-05-30 VITALS — WEIGHT: 211.4 LBS | BODY MASS INDEX: 35.18 KG/M2

## 2021-05-31 NOTE — TELEPHONE ENCOUNTER
Spoke with patients mother. Relayed results verbalized understand. Lab appt scheduled for next Thursday

## 2021-05-31 NOTE — TELEPHONE ENCOUNTER
----- Message from TATIANA Mcguire sent at 8/22/2019  4:09 PM CDT -----  Call patient (and/or his mother): the results of his blood test show that his white count has improved but is still high. His platelet count is high which could be reactive because of a recent virus possibly. He is still anemic but this is stable compared to 3 days ago. We do need to follow up with another blood test in 2 weeks to assure that his platelets, blood count, and white count is back to normal. If he starts to feel poorly again (fevers, chills, fatigue) then he needs to be seen again right away and should notify the office. As long as he continues to feel back to normal, the blood test in 2 weeks is sufficient.

## 2021-05-31 NOTE — PROGRESS NOTES
Internal Medicine Office Visit  [facility]  Patient Name: Oumar Dallas  Patient Age: 69 y.o.  YOB: 1950  MRN: [id number]    Date of Visit: 2019  Reason for Office Visit:   Chief Complaint   Patient presents with     Follow-up           Assessment / Plan / Medical Decision Makin. Fatigue, unspecified type  2. Cough  3. Leukocytosis, unspecified type  - Patient is clinically/symptomatically improved. The cough is nearly resolved and his appetite is back to baseline. He is drinking fluids now. Nausea and vomiting have resolved. Suspect that leukocytosis, thrombocytosis may have been reactive, however we will recheck today to assure improving. May not yet be normal and will likely repeat labs again in 2 weeks. Patient and his mother are advised to monitor for fever, chills, pains and return for re-evaluation if this occurs. Will contact patient and his mother with lab results and next steps   - Patient likely has a chronic cough r/t lisinopril. Will follow up with patient regarding this at next follow up visit. Consider ARB alternative         Health Maintenance Review  Health Maintenance   Topic Date Due     HEPATITIS C SCREENING  1950     ZOSTER VACCINES (1 of 2) 2000     MEDICARE ANNUAL WELLNESS VISIT  2015     FALL RISK ASSESSMENT  2017     INFLUENZA VACCINE RULE BASED (1) 2019     FECAL OCCULT BLOOD/FIT ANNUAL COLON CANCER SCREENING  2020     ADVANCE CARE PLANNING  2021     TD 18+ HE  2022     PNEUMOCOCCAL POLYSACCHARIDE VACCINE AGE 65 AND OVER  Completed     PNEUMOCOCCAL CONJUGATE VACCINE FOR ADULTS (PCV13 OR PREVNAR)  Completed         I have discontinued Oumar Dallas's aspirin, dextromethorphan, and loratadine. I am also having him maintain his metoprolol succinate, lisinopril, and atorvastatin.      HPI:  Oumar Dallas is a 69 y.o. year old who presents to the office today with his mother for  follow up of a [facility] visit seen with a complaint of a cough and decreased appetite x2 weeks.  Labs were done and a white count of 14,000 and platelet count of 614 was noted. He was afebrile. Chest xray was clear. He states that during this 2 week period he also had emesis x 1 and felt nauseated. Today his appetite is normal and he has been able to drink fluids well. He denies fever, chills, rigors. No dysuria, pains, dizziness. His mother mentions that he has a chronic dry cough that they have never mentioned before as it does not usually bother him. No black/dark or blood stools. No abdominal pain.     Review of Systems- pertinent positive in bold:  Constitutional: Fever, chills, night sweats, fainting, weight change, fatigue, seizures, dizziness, sleeping difficulties, loud snoring/pauses in breathing  Eyes: change in vision, blurred or double vision, redness/eye pain  Ears, nose, mouth, throat: change in hearing, ear pain, hoarseness, difficulty swallowing, sores in the mouth or throat  Respiratory: shortness of breath, cough, bloody sputum, wheezing  Cardiovascular: chest pain, palpitations   Gastrointestinal: abdominal pain, heartburn/indigestion, nausea/vomiting, change in appetite, change in bowel habits, constipation or diarrhea, rectal bleeding/dark stools, difficulty swallowing  Urinary: painful urination, frequent urination, urinary urgency/incontinence, blood in urine/dark urine, nocturia        Current Scheduled Meds:  Outpatient Encounter Medications as of 8/21/2019   Medication Sig Dispense Refill     atorvastatin (LIPITOR) 20 MG tablet Take 1 tablet (20 mg total) by mouth daily. 90 tablet 3     lisinopril (PRINIVIL,ZESTRIL) 40 MG tablet TAKE ONE TABLET BY MOUTH DAILY 90 tablet 3     metoprolol succinate (TOPROL-XL) 100 MG 24 hr tablet Take 1 tablet (100 mg total) by mouth daily. 90 tablet 3     [DISCONTINUED] aspirin 325 MG tablet Take 325 mg by mouth daily. Takes 1/2 tablet       [DISCONTINUED]  dextromethorphan (DELSYM) 30 mg/5 mL liquid Take 60 mg by mouth 2 (two) times a day.       [DISCONTINUED] loratadine (CLARITIN) 10 mg tablet Take 10 mg by mouth daily.       No facility-administered encounter medications on file as of 8/21/2019.      History reviewed. No pertinent past medical history.  History reviewed. No pertinent surgical history.  Social History     Tobacco Use     Smoking status: Never Smoker     Smokeless tobacco: Never Used   Substance Use Topics     Alcohol use: No     Drug use: No       Objective / Physical Examination:  Vitals:    08/21/19 1351   BP: 120/70   Pulse: 76   Temp: 97.8  F (36.6  C)   TempSrc: Oral   Weight: 204 lb (92.5 kg)     Wt Readings from Last 3 Encounters:   08/21/19 204 lb (92.5 kg)   08/19/19 206 lb 2 oz (93.5 kg)   05/16/19 213 lb (96.6 kg)     Body mass index is 33.95 kg/m .     Constitutional: In no apparent distress  Eyes: PERRL, Conjunctivae clear. Non-icteric.   ENT: Tympanic membrane clear with landmarks well visualized bilaterally. Septum midline, nares patent, no visible polyps, mucosa moist and without drainage. Lips and mucosa dry. Pharynx without erythema or exudate. Neck is supple, trachea midline. No cervical adenopathy  Respiratory: Clear to auscultation bilaterally. Normal inspiratory and expiratory effort  Cardiovascular: Regular rate and rhythm. No murmurs, rubs, or gallops. No edema.  Gastrointestinal: Bowel sounds active all four quadrants. Soft, non-tender.   Skin: Warm and dry. No rashes   Psych: Alert and oriented x3.     Orders Placed This Encounter   Procedures     Vitamin B12     Folate, Serum     HM1 (CBC with Diff)   Followup: Return in about 2 weeks (around 9/4/2019) for Recheck labs . earlier if needed.        Tamika Sauceda, CNP

## 2021-06-01 VITALS — WEIGHT: 213 LBS | BODY MASS INDEX: 35.45 KG/M2

## 2021-06-01 NOTE — TELEPHONE ENCOUNTER
----- Message from TATIANA Mcguire sent at 9/11/2019  9:58 AM CDT -----  Call patient: the results of his blood test show that he still has an elevated white blood cell count and platelet count. These have improved slightly but are not yet normal. I would like him to see a blood specialist called a hematologist to evaluate this further. I would like them to rule out a blood cancer. I will place a referral and he will receive a call to schedule the appointment.

## 2021-06-01 NOTE — TELEPHONE ENCOUNTER
Spoke with patients mother and relayed results. Verbalized understanding and no further questions at this time

## 2021-06-02 VITALS — WEIGHT: 213 LBS | BODY MASS INDEX: 35.49 KG/M2 | HEIGHT: 65 IN

## 2021-06-02 NOTE — CONSULTS
[facility] Hematology and Oncology Consult Note    Patient: Oumar Dallas  MRN: [id number]  Date of Service: 10/09/2019      Reason for Visit:    1.  Leukocytosis and thrombocytosis    Assessment/Plan:    1.  Leukocytosis and thrombocytosis: I reviewed his labs going back a few years.  There is a gap in laboratory data from June 2016 to August 2019.  Prior to this last August his counts were normal.  We rechecked his labs today and the white count, hemoglobin, and platelets follow-up come back into normal range.  Therefore the changes in late August and early September were likely reactive.  At this point I do not think he needs any further work-up.  He can come back to see us if he develops any other abnormalities in his blood counts in the future.  Plan was discussed with the patient, his sister, and mother.  Questions were answered.    ECOG Performance   ECOG Performance Status: 0    Distress Assessment  Distress Assessment Score: No distress    Problem List:    1. Thrombocytosis (H)  HM1(CBC and Differential)    Comprehensive Metabolic Panel    Flow cytometry    T4, Free    TSH    JAK2  MUTATION NGS ANALYSIS (JAK2SG)    HM1(CBC and Differential)    Comprehensive Metabolic Panel    T4, Free    TSH    HM1 (CBC with Diff)    CANCELED: Flow cytometry    CANCELED: JAK2  MUTATION NGS ANALYSIS (JAK2SG)   2. Anemia, unspecified type  HM1(CBC and Differential)    Comprehensive Metabolic Panel    Flow cytometry    T4, Free    TSH    JAK2  MUTATION NGS ANALYSIS (JAK2SG)    HM1(CBC and Differential)    Comprehensive Metabolic Panel    T4, Free    TSH    HM1 (CBC with Diff)    CANCELED: Flow cytometry    CANCELED: JAK2  MUTATION NGS ANALYSIS (JAK2SG)   3. Leukocytosis, unspecified type     4. Monocytosis       Staging History:    Cancer Staging  No matching staging information was found for the patient.    History:    Oumra is a 69-year-old gentleman referred for some blood count abnormalities.  He was seen in the primary  care clinic in mid August with complaints of looking pale, cough, decreased appetite.  From labs drawn at that visit which showed a mild leukocytosis and thrombocytosis.  His other symptoms have improved.  He has no complaints today.  Denies fevers, chills, night sweats.  His appetite is okay.  Energy is okay.  His family members state that he is been sitting on his normal self.    Past History:    Past Medical History:   Diagnosis Date     Asthma     Family History   Problem Relation Age of Onset     Diabetes Mother      Hypertension Mother       [facility] Social History     Socioeconomic History     Marital status: Single     Spouse name: Not on file     Number of children: Not on file     Years of education: Not on file     Highest education level: Not on file   Occupational History     Not on file   Social Needs     Financial resource strain: Not on file     Food insecurity:     Worry: Not on file     Inability: Not on file     Transportation needs:     Medical: Not on file     Non-medical: Not on file   Tobacco Use     Smoking status: Never Smoker     Smokeless tobacco: Never Used   Substance and Sexual Activity     Alcohol use: No     Drug use: No     Sexual activity: Never   Lifestyle     Physical activity:     Days per week: Not on file     Minutes per session: Not on file     Stress: Not on file   Relationships     Social connections:     Talks on phone: Not on file     Gets together: Not on file     Attends Judaism service: Not on file     Active member of club or organization: Not on file     Attends meetings of clubs or organizations: Not on file     Relationship status: Not on file     Intimate partner violence:     Fear of current or ex partner: Not on file     Emotionally abused: Not on file     Physically abused: Not on file     Forced sexual activity: Not on file   Other Topics Concern     Not on file   Social History Narrative     Not on file        Allergies:    No Known Allergies    Review of  Systems:    General  Fatigue: Yes - Recent (Less than 3 months)  ENT  ENT (WDL): All ENT elements are within defined limits  Respiratory  Cough: Yes - Recent (Less than 3 months)  Cardiovascular  Cardiovascular (WDL): All cardiovascular elements are within defined limits  Endocrine  Endocrine (WDL): All endocrine elements are within defined limits  Gastrointestinal  Gastrointestinal (WDL): All gastrointestinal elements are within defined limits  Musculoskeletal  Musculoskeletal (WDL): All musculoskeletal elements are within defined limits  Neurological  Neurological (WDL): All neurological elements are within defined limits  Psychological/Emotional  Psychological/Emotional (WDL): All psychological/emotional elements are within defined limits  Hematological/Lymphatic  Hematological/Lymphatic (WDL): All hematological/lymphatic elements are within defined limits  Dermatological  Dermatologic (WDL): All dermatological elements are within defined limits  Genitourinary/Reproductive  Genitourinary/Reproductive (WDL): All genitourinary/reproductive elements are within defined limits  Reproductive (Females only)     Pain  Currently in Pain: No/denies    Physical Exam:    Recent Vitals 10/9/2019   Height -   Weight 209 lbs   BSA (m2) 2.09 m2   /47   Pulse 81   Temp 97.7   Temp src 1   SpO2 97   Some recent data might be hidden     General: patient appears stated age of 69 y.o.. Nontoxic and in no distress.   HEENT: Head: atraumatic, normocephalic. Sclerae anicteric.  Chest:  Normal respiratory effort.  Clear to auscultation bilaterally.  Cardiac:  No edema.  Regular rate and rhythm.  No murmur.  Abdomen: abdomen is soft, non-distended.  No hepatospleno megaly.  Extremities: normal tone and muscle bulk. No joint swelling or redness. No clubbing.   Skin: no lesions or rash. Warm and dry.   CNS: alert and oriented. Grossly non-focal.   Psychiatric: normal mood and affect.     Lab Results:    Recent Results (from the past  168 hour(s))   Comprehensive Metabolic Panel   Result Value Ref Range    Sodium 140 136 - 145 mmol/L    Potassium 4.2 3.5 - 5.0 mmol/L    Chloride 102 98 - 107 mmol/L    CO2 29 22 - 31 mmol/L    Anion Gap, Calculation 9 5 - 18 mmol/L    Glucose 95 70 - 125 mg/dL    BUN 13 8 - 22 mg/dL    Creatinine 1.11 0.70 - 1.30 mg/dL    GFR MDRD Af Amer >60 >60 mL/min/1.73m2    GFR MDRD Non Af Amer >60 >60 mL/min/1.73m2    Bilirubin, Total 1.2 (H) 0.0 - 1.0 mg/dL    Calcium 9.8 8.5 - 10.5 mg/dL    Protein, Total 8.2 (H) 6.0 - 8.0 g/dL    Albumin 3.9 3.5 - 5.0 g/dL    Alkaline Phosphatase 78 45 - 120 U/L    AST 23 0 - 40 U/L    ALT 21 0 - 45 U/L   HM1 (CBC with Diff)   Result Value Ref Range    WBC 10.7 4.0 - 11.0 thou/uL    RBC 4.15 (L) 4.40 - 6.20 mill/uL    Hemoglobin 14.1 14.0 - 18.0 g/dL    Hematocrit 42.2 40.0 - 54.0 %     (H) 80 - 100 fL    MCH 34.0 27.0 - 34.0 pg    MCHC 33.4 32.0 - 36.0 g/dL    RDW 13.2 11.0 - 14.5 %    Platelets 436 140 - 440 thou/uL    MPV 9.5 8.5 - 12.5 fL    Neutrophils % 60 50 - 70 %    Lymphocytes % 26 20 - 40 %    Monocytes % 10 2 - 10 %    Eosinophils % 3 0 - 6 %    Basophils % 1 0 - 2 %    Neutrophils Absolute 6.5 2.0 - 7.7 thou/uL    Lymphocytes Absolute 2.8 0.8 - 4.4 thou/uL    Monocytes Absolute 1.1 (H) 0.0 - 0.9 thou/uL    Eosinophils Absolute 0.3 0.0 - 0.4 thou/uL    Basophils Absolute 0.1 0.0 - 0.2 thou/uL     Imaging Results:    No results found.      Signed by: Krishan Mann MD

## 2021-06-03 VITALS — BODY MASS INDEX: 33.95 KG/M2 | WEIGHT: 204 LBS

## 2021-06-03 VITALS
WEIGHT: 209 LBS | DIASTOLIC BLOOD PRESSURE: 47 MMHG | OXYGEN SATURATION: 97 % | TEMPERATURE: 97.7 F | HEART RATE: 81 BPM | BODY MASS INDEX: 34.78 KG/M2 | SYSTOLIC BLOOD PRESSURE: 128 MMHG

## 2021-06-03 VITALS — BODY MASS INDEX: 34.3 KG/M2 | WEIGHT: 206.13 LBS

## 2021-06-04 VITALS
SYSTOLIC BLOOD PRESSURE: 124 MMHG | HEIGHT: 65 IN | WEIGHT: 215 LBS | HEART RATE: 70 BPM | BODY MASS INDEX: 35.82 KG/M2 | DIASTOLIC BLOOD PRESSURE: 62 MMHG

## 2021-06-05 VITALS
DIASTOLIC BLOOD PRESSURE: 64 MMHG | HEART RATE: 80 BPM | SYSTOLIC BLOOD PRESSURE: 130 MMHG | BODY MASS INDEX: 35.61 KG/M2 | WEIGHT: 214 LBS

## 2021-06-10 NOTE — PROGRESS NOTES
Internal Medicine Office Visit  Patient Name: Oumar Dallas  Patient Age: 67 y.o.  YOB: 1950  MRN: [id number]  ?  Date of Visit: 2017  Reason for Office Visit:   Chief Complaint   Patient presents with     Follow-up     blood pressure check       Assessment / Plan / Medical Decision Makin. Hypertension  2. Prediabetes  3. Essential hypertension  4. HLD (hyperlipidemia)  - Oumar is advised weight loss and to avoid eating as many sweets as he reports that he does occasionally.  He is advised that he could develop diabetes and I would like him to prevent this by changing his lifestyle.  His mother will try to help him with this  - Fasting lab work today  -Continue current medications as prescribed  - 6 months follow up         Health Maintenance Review  Health Maintenance   Topic Date Due     ZOSTER VACCINE  2010     PNEUMOCOCCAL POLYSACCHARIDE VACCINE AGE 65 AND OVER  2015     FALL RISK ASSESSMENT  2017     COLONOSCOPY  2021     ADVANCE DIRECTIVES DISCUSSED WITH PATIENT  2021     TD 18+ HE  2022     INFLUENZA VACCINE RULE BASED  Completed     PNEUMOCOCCAL CONJUGATE VACCINE FOR ADULTS (PCV13 OR PREVNAR)  Completed         I am having Mr. Dallas maintain his metoprolol succinate, atorvastatin, and lisinopril.     HPI:   Encounter Diagnoses   Name Primary?     Prediabetes Yes     Hypertension      Essential hypertension      HLD (hyperlipidemia)       Oumar Dallas is a 67-year-old male with mild intellectual disabilities who presents to the office today with his mother for follow-up.    Hyperlipidemia-with his last fasting lab check he was noted to have hyperlipidemia with high ASCVD risk factors.  He was started on atorvastatin 40 mg daily.  He states that he has tolerated this medication well, no report of myalgias or arthralgias.    We reviewed his history of hypertension.  He continues to take lisinopril metoprolol.  He is not experiencing any  lightheadedness or dizziness associated with the medication.    With his last lab work he was found to have prediabetes.  He admits that he does frequently eat a lot of sweets.  He does not have a regular cardio exercise program but does do a stretching routine and some resistance exercises in the morning.  His mother states that he continues to be very helpful to her around the house.  He mows the lawn regularly and takes care of household duties that she has a difficult time doing herself.    Review of Systems: Denies chest pain, shortness of breath.    Current Scheduled Meds:  Outpatient Encounter Prescriptions as of 5/30/2017   Medication Sig Dispense Refill     atorvastatin (LIPITOR) 40 MG tablet Take 1 tablet (40 mg total) by mouth daily. 90 tablet 3     lisinopril (PRINIVIL,ZESTRIL) 40 MG tablet TAKE ONE TABLET BY MOUTH DAILY 90 tablet 3     metoprolol succinate (TOPROL-XL) 100 MG 24 hr tablet Take 1 tablet daily 90 tablet 3     [DISCONTINUED] atorvastatin (LIPITOR) 40 MG tablet Take 1 tablet (40 mg total) by mouth daily. 90 tablet 3     [DISCONTINUED] lisinopril (PRINIVIL,ZESTRIL) 40 MG tablet TAKE ONE TABLET BY MOUTH DAILY 90 tablet 2     No facility-administered encounter medications on file as of 5/30/2017.      No past medical history on file.  No past surgical history on file.  Social History   Substance Use Topics     Smoking status: Never Smoker     Smokeless tobacco: None     Alcohol use No       Objective / Physical Examination:  Vitals:    05/30/17 0906   BP: 128/74   Patient Site: Right Arm   Patient Position: Sitting   Cuff Size: Adult Large   Pulse: 63   Weight: 211 lb 6.4 oz (95.9 kg)     Wt Readings from Last 3 Encounters:   05/30/17 211 lb 6.4 oz (95.9 kg)   11/29/16 216 lb (98 kg)   06/06/16 210 lb (95.3 kg)     Body mass index is 35.18 kg/(m^2).     General Appearance: Alert and oriented, cooperative, affect appropriate, speech clear, in no apparent distress  Neck: Supple, trachea midline.  No carotid bruits   Lungs: Clear to auscultation bilaterally. Normal inspiratory and expiratory effort  Cardiovascular: Regular rate, normal S1, S2. No murmurs, rubs, or gallops  Extremities:  No edema      Orders Placed This Encounter   Procedures     Comprehensive Metabolic Panel     Lipid Profile     Glycosylated Hemoglobin A1c   Followup: Return in about 6 months (around 11/30/2017) for Recheck. earlier if needed.      Tamika Sauceda, CNP  [facility]

## 2021-06-10 NOTE — TELEPHONE ENCOUNTER
Due to be seen    Rx renewed per Medication Renewal Policy. Medication was last renewed on 5/16/2019.5/11/20.    Maria Luisa Harrison, [facility] Connection Triage/Med Refill 8/7/2020     Requested Prescriptions   Pending Prescriptions Disp Refills     atorvastatin (LIPITOR) 20 MG tablet [Pharmacy Med Name: ATORVASTATIN 20 MG TABLET] 90 tablet 0     Sig: TAKE 1 TABLET BY MOUTH DAILY       Statins Refill Protocol (Hmg CoA Reductase Inhibitors) Passed - 8/7/2020  8:47 AM        Passed - PCP or prescribing provider visit in past 12 months      Last office visit with prescriber/PCP: 8/21/2019 Tamika Sauceda FNP OR same dept: 8/21/2019 Tamika Sauceda FNP OR same specialty: 8/21/2019 Tamika Sauceda FNP  Last physical: Visit date not found Last MTM visit: Visit date not found   Next visit within 3 mo: Visit date not found  Next physical within 3 mo: Visit date not found  Prescriber OR PCP: TATIANA Austin  Last diagnosis associated with med order: 1. Mixed hyperlipidemia  - atorvastatin (LIPITOR) 20 MG tablet [Pharmacy Med Name: ATORVASTATIN 20 MG TABLET]; TAKE 1 TABLET BY MOUTH DAILY  Dispense: 90 tablet; Refill: 0    2. Essential hypertension  - metoprolol succinate (TOPROL-XL) 100 MG 24 hr tablet [Pharmacy Med Name: METOPROLOL SUCC  MG TAB]; TAKE ONE TABLET BY MOUTH ONCE DAILY  Dispense: 90 tablet; Refill: 0    If protocol passes may refill for 12 months if within 3 months of last provider visit (or a total of 15 months).                metoprolol succinate (TOPROL-XL) 100 MG 24 hr tablet [Pharmacy Med Name: METOPROLOL SUCC  MG TAB] 90 tablet 0     Sig: TAKE ONE TABLET BY MOUTH ONCE DAILY       Beta-Blockers Refill Protocol Passed - 8/7/2020  8:47 AM        Passed - PCP or prescribing provider visit in past 12 months or next 3 months     Last office visit with prescriber/PCP: 8/21/2019 Tamika Sauceda FNP OR [place] dept: 8/21/2019 Tamika Sauceda FNP OR same specialty: 8/21/2019 Tamika Sauceda  TATIANA Kim  Last physical: Visit date not found Last MTM visit: Visit date not found   Next visit within 3 mo: Visit date not found  Next physical within 3 mo: Visit date not found  Prescriber OR PCP: TATIANA Austin  Last diagnosis associated with med order: 1. Mixed hyperlipidemia  - atorvastatin (LIPITOR) 20 MG tablet [Pharmacy Med Name: ATORVASTATIN 20 MG TABLET]; TAKE 1 TABLET BY MOUTH DAILY  Dispense: 90 tablet; Refill: 0    2. Essential hypertension  - metoprolol succinate (TOPROL-XL) 100 MG 24 hr tablet [Pharmacy Med Name: METOPROLOL SUCC  MG TAB]; TAKE ONE TABLET BY MOUTH ONCE DAILY  Dispense: 90 tablet; Refill: 0    If protocol passes may refill for 12 months if within 3 months of last provider visit (or a total of 15 months).             Passed - Blood pressure filed in past 12 months     BP Readings from Last 1 Encounters:   10/09/19 128/47

## 2021-06-12 NOTE — PROGRESS NOTES
Internal Medicine Office Visit  [facility]   Patient Name: Oumar Dallas  Patient Age: 70 y.o.  YOB: 1950  MRN: [id number]    Date of Visit: 10/20/2020  Reason for Office Visit:   Chief Complaint   Patient presents with     Medication Management         Assessment / Plan / Medical Decision Makin. Hypertension  - Discussed avoiding or limiting sugary beverages and eating smaller meals.   - Continue regular physical activity like walking. Stretching and avoiding long periods of sitting.    - Basic Metabolic Panel  - HM2(CBC w/o Differential)  - metoprolol succinate (TOPROL-XL) 100 MG 24 hr tablet; Take 1 tablet (100 mg total) by mouth daily.  Dispense: 90 tablet; Refill: 1    2. Mixed hyperlipidemia  - Patient encouraged to eat more green leafy vegetables, and fresh fruits. Avoid processed, high fat foods.   - Lipid Profile  - ALT (SGPT)  - atorvastatin (LIPITOR) 20 MG tablet; Take 1 tablet (20 mg total) by mouth daily.  Dispense: 90 tablet; Refill: 3    4. Prediabetes  - Patient Has a history of obesity and HTN and Dyslipidemia  - Glycosylated Hemoglobin A1c    5. Obesity (BMI 35.0-39.9) with comorbidity (H)  - Discussed with patient the importance of maintaining a healthy weight. Patient instructed to limit calories and continue routine exercise.     6. Essential hypertension  - Basic Metabolic Panel  - HM2(CBC w/o Differential)  - metoprolol succinate (TOPROL-XL) 100 MG 24 hr tablet; Take 1 tablet (100 mg total) by mouth daily.  Dispense: 90 tablet; Refill: 1        Health Maintenance Review  Health Maintenance   Topic Date Due     ZOSTER VACCINES (1 of 2) 2000     MEDICARE ANNUAL WELLNESS VISIT  2015     INFLUENZA VACCINE RULE BASED (1) 2020     FALL RISK ASSESSMENT  10/09/2020     COLORECTAL CANCER SCREENING  2021     ADVANCE CARE PLANNING  2021     TD 18+ HE  2022     LIPID  10/20/2025     Pneumococcal Vaccine: 65+ Years   Completed     Pneumococcal Vaccine: Pediatrics (0 to 5 Years) and At-Risk Patients (6 to 64 Years)  Aged Out     HEPATITIS B VACCINES  Aged Out     HEPATITIS C SCREENING  Discontinued       I have changed Oumar Dallas's atorvastatin and metoprolol succinate. I am also having him maintain his lisinopriL.      HPI:  Oumar Dallas is a 70 y.o. year old who presents to the office today for follow up.     Here today for a med check. He reports he's been doing well. Has a cough on and off. Usually occurs upon waking up in the morning. He reports its just a dry cough early in the morning it goes away quickly. He denies fever, shortness of breath wheezing or chest tightness. He also denies hest pain or chest pressure.     His diet is poor, he reports eating Cypriot toast gasca for breakfast and some types of chocolate. During the course of the day has up to 4 cups of sweetened ice tea and one chocolate beverage. He is getting some exercise and stretching in a couple of times a day.       Review of Systems- pertinent positive in bold:  Constitutional: Denies fever, chills, night sweats, fainting, fatigue, or dizziness  Eyes: Denies change in vision, blurred or double vision, redness/eye pain  Nose, mouth, throat: Reports dry cough in the morning. Denies wheezing, shortness of breath or chest tightness.   Cardiovascular: Denies chest pain, chest pressure or palpitations   Gastrointestinal: Denies nausea/vomiting or changes in bowel habits, constipation or diarrhea, rectal bleeding/dark stools, difficulty swallowing  Musculoskeletal: Denies backache/back pain (new or increasing), weakness, joint pain/stiffness (new or increasing), muscle cramps, swelling of hands, feet, ankles, leg pain/redness  Endocrine: Denies excessive thirst/urination, cold or heat intolerance. Denies numbness or tingling in finger tips or toes.       Current Scheduled Meds:  Outpatient Encounter Medications as of 10/20/2020   Medication Sig Dispense  "Refill     atorvastatin (LIPITOR) 20 MG tablet Take 1 tablet (20 mg total) by mouth daily. 90 tablet 3     lisinopriL (PRINIVIL,ZESTRIL) 40 MG tablet TAKE ONE TABLET BY MOUTH ONCE DAILY. Due for appointment 30 tablet 0     metoprolol succinate (TOPROL-XL) 100 MG 24 hr tablet Take 1 tablet (100 mg total) by mouth daily. 90 tablet 1     [DISCONTINUED] atorvastatin (LIPITOR) 20 MG tablet TAKE 1 TABLET BY MOUTH DAILY 90 tablet 0     [DISCONTINUED] metoprolol succinate (TOPROL-XL) 100 MG 24 hr tablet TAKE ONE TABLET BY MOUTH ONCE DAILY 90 tablet 0     No facility-administered encounter medications on file as of 10/20/2020.          Past Medical History:   Diagnosis Date     Asthma      History reviewed. No pertinent surgical history.  Social History     Tobacco Use     Smoking status: Never Smoker     Smokeless tobacco: Never Used   Substance Use Topics     Alcohol use: No     Drug use: No       Objective / Physical Examination:  Vitals:    10/20/20 0929   BP: 124/62   Pulse: 70   Weight: 215 lb (97.5 kg)   Height: 5' 5\" (1.651 m)     Wt Readings from Last 3 Encounters:   10/20/20 215 lb (97.5 kg)   10/09/19 209 lb (94.8 kg)   08/21/19 204 lb (92.5 kg)     Body mass index is 35.78 kg/m .     Constitutional: In no apparent distress  Eyes: Conjunctivae clear. Non-icteric.   ENT: Septum midline, nares patent, no visible polyps, mucosa moist and without drainage. Lips and mucosa moist. Pharynx without erythema or exudate. Neck is supple, trachea midline.  Respiratory: Clear to auscultation bilaterally. Normal inspiratory and expiratory effort  Cardiovascular: Regular rate and rhythm. No murmurs, rubs, or gallops. No edema. No carotid bruits.   Psych: Alert and oriented x3.     Orders Placed This Encounter   Procedures     Lipid Profile     Basic Metabolic Panel     ALT (SGPT)     Glycosylated Hemoglobin A1c     HM2(CBC w/o Differential)   Followup: Return in about 3 months (around 1/20/2021). earlier if needed.        Marguerite" TATIANA Smith Student        Patient was seen with NP student, Marguerite Tejeda. I agree with assessment and plan.    Tamika Sauceda, DNP

## 2021-06-12 NOTE — TELEPHONE ENCOUNTER
RN cannot approve Refill Request    RN can NOT refill this medication Protocol failed and NO refill given. Last office visit: 8/21/2019 Tamika Sauceda FNP Last Physical: Visit date not found Last MTM visit: Visit date not found Last visit same specialty: 8/21/2019 Tamika Sauceda FNP.  Next visit within 3 mo: Visit date not found  Next physical within 3 mo: Visit date not found      Maria Luisa Harrison, [facility] Connection Triage/Med Refill 10/10/2020    Requested Prescriptions   Pending Prescriptions Disp Refills     lisinopriL (PRINIVIL,ZESTRIL) 40 MG tablet [Pharmacy Med Name: LISINOPRIL 40 MG TABS 40 Tablet] 90 tablet 0     Sig: TAKE ONE TABLET BY MOUTH ONCE DAILY       Ace Inhibitors Refill Protocol Failed - 10/9/2020  8:28 AM        Failed - PCP or prescribing provider visit in past 12 months       Last office visit with prescriber/PCP: 8/21/2019 Tamika Sauceda FNP OR same dept: Visit date not found OR same specialty: 8/21/2019 Tamika Sauceda FNP  Last physical: Visit date not found Last MTM visit: Visit date not found   Next visit within 3 mo: Visit date not found  Next physical within 3 mo: Visit date not found  Prescriber OR PCP: TATIANA Austin  Last diagnosis associated with med order: 1. Essential hypertension  - lisinopriL (PRINIVIL,ZESTRIL) 40 MG tablet [Pharmacy Med Name: LISINOPRIL 40 MG TABS 40 Tablet]; TAKE ONE TABLET BY MOUTH ONCE DAILY  Dispense: 90 tablet; Refill: 0    If protocol passes may refill for 12 months if within 3 months of last provider visit (or a total of 15 months).             Failed - Serum Potassium in past 12 months     No results found for: LN-POTASSIUM          Failed - Blood pressure filed in past 12 months     BP Readings from Last 1 Encounters:   10/09/19 128/47             Failed - Serum Creatinine in past 12 months     Creatinine   Date Value Ref Range Status   10/09/2019 1.11 0.70 - 1.30 mg/dL Final

## 2021-06-12 NOTE — TELEPHONE ENCOUNTER
Patient Returning Call  Reason for call:  Call back  Information relayed to patient:  Writer relayed message to Pt's mom: Called pt and LVM that pt is due for appointment with PCP -mailed letter to address on file    Mom agrees, and understands. Writer transferred Mom to scheduling.     Patient has additional questions:  No  If YES, what are your questions/concerns:  N/A  Okay to leave a detailed message?: No call back needed

## 2021-06-13 NOTE — TELEPHONE ENCOUNTER
Refill Approved    Rx renewed per Medication Renewal Policy. Medication was last renewed on 10/12/20.    Cheyanne Álvarez, [facility] Triage/Med Refill 11/14/2020     Requested Prescriptions   Pending Prescriptions Disp Refills     lisinopriL (PRINIVIL,ZESTRIL) 40 MG tablet [Pharmacy Med Name: LISINOPRIL 40 MG TABS 40 Tablet] 30 tablet 0     Sig: TAKE ONE TABLET BY MOUTH ONCE DAILY. DUE FOR APPOINTMENT       [employer] Refill Protocol Passed - 11/10/2020 10:03 AM        Passed - PCP or prescribing provider visit in past 12 months       Last office visit with prescriber/PCP: 10/20/2020 Tamika Sauceda FNP OR same dept: 10/20/2020 Tamika Sauceda FNP OR same specialty: 10/20/2020 Tamika Sauceda FNP  Last physical: Visit date not found Last MTM visit: Visit date not found   Next visit within 3 mo: Visit date not found  Next physical within 3 mo: Visit date not found  Prescriber OR PCP: TATIANA Austin  Last diagnosis associated with med order: 1. Essential hypertension  - lisinopriL (PRINIVIL,ZESTRIL) 40 MG tablet [Pharmacy Med Name: LISINOPRIL 40 MG TABS 40 Tablet]; TAKE ONE TABLET BY MOUTH ONCE DAILY. Due for appointment  Dispense: 30 tablet; Refill: 0    If protocol passes may refill for 12 months if within 3 months of last provider visit (or a total of 15 months).             Passed - Serum Potassium in past 12 months     Lab Results   Component Value Date    Potassium 4.7 10/20/2020             Passed - Blood pressure filed in past 12 months     BP Readings from Last 1 Encounters:   10/20/20 124/62             Passed - Serum Creatinine in past 12 months     Creatinine   Date Value Ref Range Status   10/20/2020 1.21 0.70 - 1.30 mg/dL Final

## 2021-06-16 PROBLEM — D75.839 THROMBOCYTOSIS: Status: ACTIVE | Noted: 2021-04-22

## 2021-06-16 PROBLEM — E66.01 MORBID OBESITY (H): Status: ACTIVE | Noted: 2019-05-16

## 2021-06-16 PROBLEM — D72.821 MONOCYTOSIS: Status: ACTIVE | Noted: 2019-10-09

## 2021-06-16 NOTE — PROGRESS NOTES
Internal Medicine Office Visit  [facility]   Patient Name: Oumar Dallas  Patient Age: 71 y.o.  YOB: 1950  MRN: [id number]    Date of Visit: 4/22/2021  Reason for Office Visit:   Chief Complaint   Patient presents with     Follow-up           Assessment / Plan / Medical Decision Making:    Problem List Items Addressed This Visit     HLD (hyperlipidemia), ASCVD risk 19.1% 6/2016    Relevant Medications    atorvastatin (LIPITOR) 20 MG tablet    Hypertension     Stable with current medications          Relevant Medications    lisinopriL (PRINIVIL,ZESTRIL) 40 MG tablet    metoprolol succinate (TOPROL-XL) 100 MG 24 hr tablet    Obesity (BMI 35.0-39.9) with comorbidity (H)     Encouraged 30 minutes of exercise most days of the week          Prediabetes     Encouraged 30 minutes of exercise most days of the week          Relevant Orders    Glycosylated Hemoglobin A1c (Completed)    Thrombocytosis (H)    Relevant Orders    HM2(CBC w/o Differential) (Completed)      Other Visit Diagnoses     Onychomycosis    -  Primary    Relevant Orders    Ambulatory referral to Podiatry [facility] (includes FPA groups)           I am having Oumar Dallas maintain his atorvastatin, lisinopriL, and metoprolol succinate.                Orders Placed This Encounter   Procedures     HM2(CBC w/o Differential)     Glycosylated Hemoglobin A1c     Ambulatory referral to [facility] (includes FPA groups)   Followup: Return in about 6 months (around 10/22/2021) for Next scheduled follow up. earlier if needed.        Tamika Sauceda CNP        HPI:  Oumar Dallas is a 71 y.o. year old who presents to the office today for follow up. He does not have any concerns. His mother sent me a note earlier that she is concerned that he needs help with cutting his toenails.     HTN was reviewed. No chest pain, shortness of breath, dizziness.     He does a short exercise routine  every morning. We reviewed his weight today.         Health Maintenance Review  Health Maintenance   Topic Date Due     ZOSTER VACCINES (1 of 2) Never done     MEDICARE ANNUAL WELLNESS VISIT  Never done     INFLUENZA VACCINE RULE BASED (1) 08/01/2020     FALL RISK ASSESSMENT  10/09/2020     COLORECTAL CANCER SCREENING  09/09/2021     ADVANCE CARE PLANNING  11/29/2021     TD 18+ HE  09/26/2022     LIPID  10/20/2025     Pneumococcal Vaccine: 65+ Years  Completed     COVID-19 Vaccine  Completed     Pneumococcal Vaccine: Pediatrics (0 to 5 Years) and At-Risk Patients (6 to 64 Years)  Aged Out     HEPATITIS B VACCINES  Aged Out     HEPATITIS C SCREENING  Discontinued       Current Scheduled Meds:  Outpatient Encounter Medications as of 4/22/2021   Medication Sig Dispense Refill     [DISCONTINUED] atorvastatin (LIPITOR) 20 MG tablet Take 1 tablet (20 mg total) by mouth daily. 90 tablet 3     [DISCONTINUED] lisinopriL (PRINIVIL,ZESTRIL) 40 MG tablet TAKE ONE TABLET BY MOUTH ONCE DAILY. 90 tablet 3     [DISCONTINUED] metoprolol succinate (TOPROL-XL) 100 MG 24 hr tablet Take 1 tablet (100 mg total) by mouth daily. 90 tablet 1     atorvastatin (LIPITOR) 20 MG tablet Take 1 tablet (20 mg total) by mouth daily. 90 tablet 1     lisinopriL (PRINIVIL,ZESTRIL) 40 MG tablet TAKE ONE TABLET BY MOUTH ONCE DAILY. 90 tablet 1     metoprolol succinate (TOPROL-XL) 100 MG 24 hr tablet Take 1 tablet (100 mg total) by mouth daily. 90 tablet 1     No facility-administered encounter medications on file as of 4/22/2021.          Objective / Physical Examination:  Vitals:    04/22/21 0938   BP: 130/64   Pulse: 80   Weight: 214 lb (97.1 kg)     Wt Readings from Last 3 Encounters:   04/22/21 214 lb (97.1 kg)   10/20/20 215 lb (97.5 kg)   10/09/19 209 lb (94.8 kg)     Body mass index is 35.61 kg/m .     Constitutional: In no apparent distress  Respiratory: Clear to auscultation bilaterally. Normal inspiratory and expiratory effort  Cardiovascular:  Regular rate and rhythm. No murmurs, rubs, or gallops. No edema. No carotid bruits.   Psych: Alert and oriented x3.   Skin: toenails are dystrophic

## 2021-06-16 NOTE — PATIENT INSTRUCTIONS - HE
- Start doing your exercise routine in the morning AND the afternoon. You need about 30 minutes of exercise every day  - I put in a referral so you could see the same podiatrist that your Mom sees if you need help trimming your thickened toe nails  - See me back in the office in 6 months, in October

## 2021-06-17 NOTE — PROGRESS NOTES
Internal Medicine Office Visit  [facility]  Patient Name: Oumar Dallas  Patient Age: 68 y.o.  YOB: 1950  MRN: [id number]    Date of Visit: 2018  Reason for Office Visit:   Chief Complaint   Patient presents with     Follow-up     6 months/ colonoscopy           Assessment / Plan / Medical Decision Makin. HLD (hyperlipidemia)  2. Prediabetes  3. Colon cancer screening  4. HTN (hypertension)  - Educated regarding decreasing sugary snacks and increasing physical activity like walking.  - Fasting labs in 1 month: Lipid profile, glycosylated Hemoglobin A1c and comprehensive metabolic panel  - Continue atorvastatin  - Continue metoprolol succinate (TOPROL-XL) 100 MG 24 hr tablet daily  - Continue Lisinopril 40 mg tablet daily. Refills sent  - Reports sending in a fecal kit through the insurance early this year, but no results available. They are unsure of any contact information to request results. Patient would prefer least invasive screening option, Clinic Occult Blood (ICT) card  - Follow up in 1 year or sooner if needed           Health Maintenance Review  Health Maintenance   Topic Date Due     FALL RISK ASSESSMENT  2017     ZOSTER VACCINE  2019 (Originally 2010)     COLONOSCOPY  2021     ADVANCE DIRECTIVES DISCUSSED WITH PATIENT  2021     TD 18+ HE  2022     PNEUMOCOCCAL POLYSACCHARIDE VACCINE AGE 65 AND OVER  Completed     INFLUENZA VACCINE RULE BASED  Completed     PNEUMOCOCCAL CONJUGATE VACCINE FOR ADULTS (PCV13 OR PREVNAR)  Completed         I have changed Mr. Dallas's metoprolol succinate. I am also having him maintain his lisinopril.      HPI:  Oumar Dallas is a 68 y.o. year old with mild intellectual disabilities who presents to the office today for follow up. His mother Isa was present for the second half of the visit.     HTN: Taking his medications as prescribed. Denies any lightheadedness, dizziness,  headaches or chest pain    HLD: He is happy he remembers to take his atorvastatin daily at night-time. Denies any arthralgia or myalgias.     Prediabetes: He tries to limit snacks and soda, but he likes sweets. Limited physical activity but walks to mailbox and back and forth from the garage. He does some stretching and ab exercises but no set routine.     Colon cancer screening: Patient and his mother confirm he has not had a colonoscopy. He is unsure if there is any family history of colon cancer. His mother reports they sent in a kit from their insurance company with a stool sample earlier this year but never heard any results. There are no results available in Epic. Patient's mother does not have the contact information for the company who sent the kit. Patient would prefer the least invasive screening option.     Review of Systems- pertinent positive in bold:  10 point ROS negative.       Current Scheduled Meds:  Outpatient Encounter Prescriptions as of 4/24/2018   Medication Sig Dispense Refill     lisinopril (PRINIVIL,ZESTRIL) 40 MG tablet TAKE ONE TABLET BY MOUTH DAILY 90 tablet 3     metoprolol succinate (TOPROL-XL) 100 MG 24 hr tablet Take 1 tablet (100 mg total) by mouth daily. 90 tablet 3     [DISCONTINUED] atorvastatin (LIPITOR) 40 MG tablet Take 1 tablet (40 mg total) by mouth daily. 90 tablet 3     [DISCONTINUED] atorvastatin (LIPITOR) 40 MG tablet Take 1 tablet (40 mg total) by mouth daily. 90 tablet 3     [DISCONTINUED] lisinopril (PRINIVIL,ZESTRIL) 40 MG tablet TAKE ONE TABLET BY MOUTH DAILY 90 tablet 3     [DISCONTINUED] metoprolol succinate (TOPROL-XL) 100 MG 24 hr tablet TAKE ONE TABLET BY MOUTH DAILY 90 tablet 3     No facility-administered encounter medications on file as of 4/24/2018.      No past medical history on file.  No past surgical history on file.  Social History   Substance Use Topics     Smoking status: Never Smoker     Smokeless tobacco: Not on file     Alcohol use No        Objective / Physical Examination:  Vitals:    04/24/18 1518   BP: 124/74   Pulse: 70   Weight: 213 lb (96.6 kg)     Wt Readings from Last 3 Encounters:   04/24/18 213 lb (96.6 kg)   05/30/17 211 lb 6.4 oz (95.9 kg)   11/29/16 216 lb (98 kg)     Body mass index is 35.45 kg/(m^2).     General Appearance: Alert and oriented, cooperative, affect appropriate, speech clear, in no apparent distress  Lungs: Clear to auscultation bilaterally. Normal inspiratory and expiratory effort  Cardiovascular: Regular rate, normal S1, S2. No murmurs, rubs, or gallops. No carotid bruits.  Abdomen: Bowel sounds active all four quadrants. Soft, non-tender.   Extremities: Pulses 2+ and equal throughout. No edema.         Orders Placed This Encounter   Procedures     Pneumococcal polysaccharide vaccine 23-valent 1 yo or older, subq/IM     Comprehensive Metabolic Panel     Lipid Profile     Glycosylated Hemoglobin A1c     Occult Blood(ICT)     Followup: Return in about 1 year (around 4/24/2019). earlier if needed.          Tamika Sauceda, CNP

## 2021-06-17 NOTE — PATIENT INSTRUCTIONS - HE
Patient Instructions by Carmen Negrete CNP at 8/19/2019  9:00 AM     Author: Carmen Negrete CNP Service: -- Author Type: Nurse Practitioner    Filed: 8/19/2019 11:17 AM Encounter Date: 8/19/2019 Status: Addendum    : Carmen Negrete CNP (Nurse Practitioner)    Related Notes: Original Note by Carmen Negrete CNP (Nurse Practitioner) filed at 8/19/2019 11:15 AM       Oumar has some abnormal labs today indicating that he may have an infection and some mild anemia.  There is no pneumonia.  Urine, kidneys and electrolytes look good today.  I am not sure what is causing his symptoms at this time.    Omuar should drink at least 8 glasses of water or uncaffeinated beverages today.      Diet as tolerated.    He should be rechecked tomorrow in his clinic for further workup.      Oumar should go to the emergency room with high fevers at any time.      Patient Education     Weakness with Uncertain Cause  Based on your exam today, the exact cause of your weakness is not certain. But your weakness does not seem to be a sign of a serious illness at this time. Keep an eye on your symptoms and get medical advice as instructed below.  Home care    Rest at home today. Don't over-exert yourself.    Take any medicine as prescribed.    For the next few days, drink extra fluids (unless your healthcare provider wants you to restrict fluids for other reasons). Don't skip meals.    Unless otherwise directed, continue to take any prescription medicines.    Contact your healthcare provider if you have any questions or concerns.  Follow-up care  Follow up with your healthcare provider, or as advised.  When to seek medical advice  Call your healthcare provider right away for any of the following:    Symptoms get worse    Symptoms don't start getting better within 2 days    Fever of 100.4  F (38  C) or higher, or as directed by your healthcare provider  Call 911  Call 911 for any of these:    Chest, arm, neck, jaw, or upper  back pain    Trouble breathing    Numbness or weakness of the face, one arm, or one leg    Slurred speech, confusion, or trouble speaking, walking, or seeing    Blood in vomit or stool (black or red color)    Loss of consciousness    Severe headache  Date Last Reviewed: 10/1/2017    0311-8798 [employer]. [address] [place] [address]. All rights reserved. This information is not intended as a substitute for professional medical care. Always follow your healthcare professional's instructions.

## 2021-06-18 NOTE — PATIENT INSTRUCTIONS - HE
Patient Instructions by Tamika Sauceda FNP at 10/20/2020  9:30 AM     Author: Tamika Sauceda FNP Service: -- Author Type: Nurse Practitioner    Filed: 10/20/2020  9:53 AM Encounter Date: 10/20/2020 Status: Signed    : Tamika Sauceda FNP (Nurse Practitioner)       Patient Education     Losing Weight for Heart Health  Excess weight is a major risk factor for heart disease. Losing weight has many benefits including lowering your blood pressure, improving your cholesterol level, and decreasing your risk for diseases such as diabetes and heart disease. It may help keep your arteries open so that your heart can get the oxygen-rich blood it needs. All in all, losing weight makes you healthier and is one of the best ways to improve your heart's health.    Calories and weight loss    Calories are the fuel your body burns for energy. You get the calories you need from the food you eat. For healthy weight loss, women should eat at least 1,200 calories a day, men at least 1,500.    When you eat more calories than you need, your body stores the extra calories as fat. One pound of fat equals 3,500 calories.    To lose weight, try to reduce your total calorie intake by 500 calories. To do this, eat 250 calories less each day. Add activity to burn the other 250 calories. Walking 2.5 miles burns about 250 calories. Other more intense activities can burn more calories in the time you spend doing them, such as swimming and running. It's important to understand that reducing calorie intake is much more effective at weight loss than is exercise.    Eat a variety of healthy foods to get the nutrients you need while you are cutting your calories to reduce your weight.    Tips for losing weight    Drink 8 to 10 glasses of water a day.    Dont skip meals. Instead, eat smaller portions.    Eat your meals earlier in the day.    Cut out sugary drinks such as soda and fruit juices.    Make your later meals lighter than  your earlier meals.    Brisk activity is best  Brisk activity gets your heart pumping faster and it makes it healthier. Its also a great way to burn calories. In fact, your body may keep burning calories for hours after you stop a brisk activity:    Start by walking 10 minutes most days.    Add more time and speed to your walk. Build up as you feel able.    Aim for at least 150 minutes of moderate-intensity aerobic activity such as brisk walking or 75 minutes of vigorous aerobic activity such as swimming laps each week to get the most health benefits.    Get up a move and sit less. Sitting too much is linked with increased risk of heart disease. Moving more and sitting less can help cut this risk.    The most important part of the activity is that you break a sweat. This means your heart is working hard enough to burn fat.  Date Last Reviewed: 7/1/2019 2000-2019 [employer]. [address] [place] [address], [place] [address]. All rights reserved. This information is not intended as a substitute for professional medical care. Always follow your healthcare professional's instructions.

## 2021-06-19 NOTE — LETTER
Letter by Tamika Sauceda FNP at      Author: Tamika Sauceda FNP Service: -- Author Type: --    Filed:  Encounter Date: 5/21/2019 Status: (Other)         Oumar MCMULLEN Burt  [address] [facility] [address]             May 21, 2019         Dear Mr. Dallas,    Below are the results from your recent visit:    Resulted Orders   Occult Blood(ICT)   Result Value Ref Range    Fecal Occult Bld (ICT) 1 Negative Negative      Comment:      5/16/19    Fecal Occult Blood (ICT) 2 Negative Negative      Comment:      5/17/19    Fecal Occult Blood (ICT) 3 Negative Negative      Comment:      5/18/20     Your stool test is negative for blood. We should repeat this once per year     Please call with questions or contact us using [employer].    Sincerely,        Electronically signed by TATIANA Austin

## 2021-06-19 NOTE — LETTER
Letter by Tamika Sauceda FNP at      Author: Tamika Sauceda FNP Service: -- Author Type: --    Filed:  Encounter Date: 5/16/2019 Status: (Other)         Oumar Dallas  [address] [place] [id number]             May 16, 2019         Dear Mr. Dallas,    Below are the results from your recent visit:    Resulted Orders   Comprehensive Metabolic Panel   Result Value Ref Range    Sodium 140 136 - 145 mmol/L    Potassium 4.3 3.5 - 5.0 mmol/L    Chloride 103 98 - 107 mmol/L    CO2 24 22 - 31 mmol/L    Anion Gap, Calculation 13 5 - 18 mmol/L    Glucose 110 70 - 125 mg/dL    BUN 14 8 - 22 mg/dL    Creatinine 1.02 0.70 - 1.30 mg/dL    GFR MDRD Af Amer >60 >60 mL/min/1.73m2    GFR MDRD Non Af Amer >60 >60 mL/min/1.73m2    Bilirubin, Total 1.6 (H) 0.0 - 1.0 mg/dL    Calcium 9.8 8.5 - 10.5 mg/dL    Protein, Total 7.8 6.0 - 8.0 g/dL    Albumin 4.0 3.5 - 5.0 g/dL    Alkaline Phosphatase 84 45 - 120 U/L    AST 23 0 - 40 U/L    ALT 24 0 - 45 U/L    Narrative    Fasting Glucose reference range is 70-99 mg/dL per  American Diabetes Association (ADA) guidelines.   Glycosylated Hemoglobin A1c   Result Value Ref Range    Hemoglobin A1c 6.1 (H) 3.5 - 6.0 %   Lipid Profile   Result Value Ref Range    Triglycerides 132 <=149 mg/dL    Cholesterol 117 <=199 mg/dL    LDL Calculated 51 <=129 mg/dL    HDL Cholesterol 40 >=40 mg/dL    Patient Fasting > 8hrs? Yes        Your blood sugar is high but you do not have signs of diabetes. Try to cut back on snacking and get more exercise every day to help with your weight.     Cholesterol readings are very good.     Electrolytes, kidney, and liver function tests look good.     Please call with questions or contact us using [employer].    Sincerely,        Electronically signed by TATIANA Austin

## 2021-06-20 NOTE — LETTER
Letter by Tamika Sauceda FNP at      Author: Tamika Sauceda FNP Service: -- Author Type: --    Filed:  Encounter Date: 10/9/2020 Status: (Other)       Oumar Dallas  [address] [place] [address]      10/13/20      Dear Oumar,      In reviewing your records, we have determined a medication check is needed before your next refill, please call the [facility] to schedule an appointment.      Medication check/review      We have made attempts to call you for an appointment, please verify your contact information is correct when calling back for an appointment, or if you have transferred your care to another clinic, please contact us so we can update our records.     Please call [phone] to schedule an appointment.    We believe that a strong preventative care program, including regular physicals and follow-up care is an important part of a healthy lifestyle and we are committed to helping you maintain your health.    Thank you for choosing us as your health care provider.    Sincerely,    Caridad Acevedo   CMA - CMT/CA  [facility]  [address] [address]  [phone]

## 2021-06-20 NOTE — LETTER
Letter by Tamika Sauceda FNP at      Author: Tamika Sauceda FNP Service: -- Author Type: --    Filed:  Encounter Date: 8/7/2020 Status: (Other)         Oumar Dallas  [address] [facility] [address]      08/10/20      Dear Oumar,      In reviewing your records, we have determined an appointment is needed, please call the [facility] to schedule a:      Annual Wellness Visit/Physical      We have made attempts to call you for an appointment, please verify your contact information is correct when calling back for an appointment, or if you have transferred your care to another clinic, please contact us so we can update our records.     Please call [phone] to schedule an appointment.    We believe that a strong preventative care program, including regular physicals and follow-up care is an important part of a healthy lifestyle and we are committed to helping you maintain your health.    Thank you for choosing us as your health care provider.    Sincerely,    Caridad Acevedo CMA - CMT/CA  [facility]  [address] [id number] [phone]

## 2021-06-21 NOTE — LETTER
Letter by Tamika Sauceda FNP at      Author: Tamika Sauceda FNP Service: -- Author Type: --    Filed:  Encounter Date: 10/21/2020 Status: (Other)         Oumar Dallas  [address] [place] [id number]             October 21, 2020         Dear Mr. Dallas,    Below are the results from your recent visit:    Resulted Orders   Lipid Profile   Result Value Ref Range    Triglycerides 187 (H) <=149 mg/dL    Cholesterol 134 <=199 mg/dL    LDL Calculated 52 <=129 mg/dL    HDL Cholesterol 45 >=40 mg/dL    Patient Fasting > 8hrs? No    Basic Metabolic Panel   Result Value Ref Range    Sodium 139 136 - 145 mmol/L    Potassium 4.7 3.5 - 5.0 mmol/L    Chloride 102 98 - 107 mmol/L    CO2 27 22 - 31 mmol/L    Anion Gap, Calculation 10 5 - 18 mmol/L    Glucose 128 (H) 70 - 125 mg/dL    Calcium 10.0 8.5 - 10.5 mg/dL    BUN 14 8 - 28 mg/dL    Creatinine 1.21 0.70 - 1.30 mg/dL    GFR MDRD Af Amer >60 >60 mL/min/1.73m2    GFR MDRD Non Af Amer 59 (L) >60 mL/min/1.73m2    Narrative    Fasting Glucose reference range is 70-99 mg/dL per  American Diabetes Association (ADA) guidelines.   ALT (SGPT)   Result Value Ref Range    ALT 27 0 - 45 U/L   Glycosylated Hemoglobin A1c   Result Value Ref Range    Hemoglobin A1c 6.1 (H) <=5.6 %      Comment:      Normal <5.7% Prediabete 5.7-6.4% Diabletes 6.5% or higher - adopted from ADA consensus guidelines   HM2(CBC w/o Differential)   Result Value Ref Range    WBC 11.4 (H) 4.0 - 11.0 thou/uL    RBC 4.44 4.40 - 6.20 mill/uL    Hemoglobin 15.3 14.0 - 18.0 g/dL    Hematocrit 45.2 40.0 - 54.0 %     (H) 80 - 100 fL    MCH 34.5 (H) 27.0 - 34.0 pg    MCHC 33.9 32.0 - 36.0 g/dL    RDW 10.0 (L) 11.0 - 14.5 %    Platelets 368 140 - 440 thou/uL    MPV 7.8 7.0 - 10.0 fL     Cholesterol levels look good.    Blood counts are stable.    Your blood sugar is in a prediabetic range. Remember to watch your sugar intake and try to exercise every day.     Liver function test is normal.      Electrolytes are normal. Kidney function is stable.     Please call with questions or contact us using MyChart.    Sincerely,        Electronically signed by TATIANA Austin

## 2021-06-21 NOTE — LETTER
Letter by Tamika Sauceda FNP at      Author: Tamika Sauceda FNP Service: -- Author Type: --    Filed:  Encounter Date: 4/22/2021 Status: (Other)         Oumar Dallas  [address] [place] [id number]       April 22, 2021         Dear Mr. Dallas,    Below are the results from your recent visit:    Resulted Orders   HM2(CBC w/o Differential)   Result Value Ref Range    WBC 11.7 (H) 4.0 - 11.0 thou/uL    RBC 4.24 (L) 4.40 - 6.20 mill/uL    Hemoglobin 14.5 14.0 - 18.0 g/dL    Hematocrit 42.4 40.0 - 54.0 %     80 - 100 fL    MCH 34.2 (H) 27.0 - 34.0 pg    MCHC 34.2 32.0 - 36.0 g/dL    RDW 12.5 11.0 - 14.5 %    Platelets 375 140 - 440 thou/uL    MPV 9.8 7.0 - 10.0 fL   Glycosylated Hemoglobin A1c   Result Value Ref Range    Hemoglobin A1c 6.3 (H) <=5.6 %     Your blood tests show that you have prediabetes. It is really important that you exercise more like we talked about in the office. I would also like you to eat less sweets and unhealthy snacks. More fruits and vegetables are okay.    Blood counts are stable.      Please call with questions or contact us using [employer].    Sincerely,        Electronically signed by TATIANA Austin

## 2021-06-27 NOTE — PROGRESS NOTES
Progress Notes by Carmen Negrete CNP at 8/19/2019  9:00 AM     Author: Carmen Negrete CNP Service: -- Author Type: Nurse Practitioner    Filed: 8/19/2019 11:50 AM Encounter Date: 8/19/2019 Status: Signed    : Carmen Negrete CNP (Nurse Practitioner)       Chief Complaint   Patient presents with   ? Allergies     x2wks, cough, decreased appetite, not himself, but getting better per mother       ASSESSMENT & PLAN:   Diagnoses and all orders for this visit:    Fatigue, unspecified type  -     HM1(CBC and Differential)  -     ISTAT CHEM 7 ([facility] ONLY)  -     Glycosylated Hemoglobin A1c  -     HM1 (CBC with Diff)    Prediabetes  -     Glycosylated Hemoglobin A1c    Cough  -     XR Chest 2 Views    Urinary frequency  -     Urinalysis-UC if Indicated        MDM:  Patient alert, but appears pale and patient's mother does also state that he looks pale.  Nonspecific decreased appetite with improving cough.  Work-up does show a white blood cell count of 14,000, platelet count of 614 which may be related to infection, mild anemia.  Chest x-ray, urine, i-STAT BMP and hemoglobin A1c are all normal stable.  As patient does not have a fever, do not think he needs to be in the hospital for sepsis work-up at this time.     Has like home that he visits, but has not had an EM rash and labs are not consistent with, for instance, anaplasmosis or Ehrlichia.    Patient assisted to make clinic appointment in 2 days with primary care provider.  Intention is to have his CBC rechecked and address as needed.  Differential does also include cancerous process in the absence of fever.  Is not on any steroids.  In the meantime, patient and caregiver to monitor at home for fevers, increasing weakness or other symptoms.      Increase fluids.      Supportive care discussed.  See discharge instructions below for specific recommendations given.    At the end of the encounter, I discussed results, diagnosis, medications. Discussed red  "flags for immediate return to clinic/ER, as well as indications for follow up if no improvement. Patient and/or caregiver understood and agreed to plan. Patient was stable for discharge.    SUBJECTIVE    HPI:  HPI  Oumar Dallas presents to the walk-in clinic with cough, decreased appetite. Felt ill when it started 2 weeks ago.  Here with mother who is also concerned about decreased eating and drinking.      History obtained from the patient.    No past medical history on file.    Active Ambulatory (Non-Hospital) Problems    Diagnosis   ? Obesity (BMI 35.0-39.9) with comorbidity (H)   ? Mild intellectual disabilities   ? Prediabetes   ? HLD (hyperlipidemia), ASCVD risk 19.1% 6/2016   ? Dermatitis   ? Hypertension       Family History   Problem Relation Age of Onset   ? Diabetes Mother    ? Hypertension Mother        Social History     Tobacco Use   ? Smoking status: Never Smoker   ? Smokeless tobacco: Never Used   Substance Use Topics   ? Alcohol use: No       Review of Systems   Constitutional: Positive for activity change (not participating in usual activities,but has been up and around) and appetite change. Negative for chills and fever.   HENT: Negative for congestion, ear pain, rhinorrhea and sneezing.    Respiratory: Positive for cough. Negative for chest tightness.    Gastrointestinal: Negative for abdominal pain, blood in stool, nausea and vomiting.        \"I just don't want to eat very much and get full\"   Endocrine: Positive for polyuria (\"up at night\").   Genitourinary: Negative for dysuria.   Musculoskeletal: Negative for arthralgias, joint swelling and myalgias.   Skin: Negative for rash and wound.   Neurological: Negative for dizziness.        Doesn't seem weak per mother, just not as active as usual        OBJECTIVE    Vitals:    08/19/19 0919 08/19/19 1124   BP: 98/65 108/70   Patient Site: Right Arm Right Arm   Patient Position: Sitting Sitting   Cuff Size: Adult Large Adult Large   Pulse: (!) 106 " (!) 104   Resp: 16 16   Temp: 97.8  F (36.6  C) 98.1  F (36.7  C)   TempSrc: Oral Oral   SpO2: 97% 96%   Weight: 206 lb 2 oz (93.5 kg)        Physical Exam   Constitutional: He is oriented to person, place, and time. He appears well-developed and well-nourished. No distress.   HENT:   Right Ear: External ear normal.   Left Ear: External ear normal.   Eyes: Conjunctivae are normal. Right eye exhibits no discharge. Left eye exhibits no discharge.   Cardiovascular: Regular rhythm, normal heart sounds and intact distal pulses. Exam reveals no gallop and no friction rub.   No murmur heard.  Mild tachycardia   Pulmonary/Chest: Effort normal and breath sounds normal.   Abdominal: Soft. Bowel sounds are normal. He exhibits no distension.   Musculoskeletal: Normal range of motion.   Lymphadenopathy:     He has no cervical adenopathy.   Neurological: He is alert and oriented to person, place, and time.   Skin: Skin is warm and dry. Capillary refill takes less than 2 seconds.   Psychiatric: He has a normal mood and affect. His behavior is normal. Judgment and thought content normal.       Labs:  Recent Results (from the past 240 hour(s))   ISTAT CHEM 7 ([facility] ONLY)   Result Value Ref Range    Sodium 137 136 - 145 mmol/L    Potassium 4.4 3.5 - 5.5 mmol/L    CO2 26 22 - 31 mmol/L    Chloride 100 98 - 107 mmol/L    Anion Gap, Calculation 11 5 - 18 mmol/L    Glucose 164 (H) 70 - 125 mg/dL    BUN 21 8 - 22 mg/dL    Creatinine 1.10 0.80 - 1.50 mg/dL   Glycosylated Hemoglobin A1c   Result Value Ref Range    Hemoglobin A1c 6.2 (H) 3.5 - 6.0 %   HM1 (CBC with Diff)   Result Value Ref Range    WBC 14.0 (H) 4.0 - 11.0 thou/uL    RBC 3.91 (L) 4.40 - 6.20 mill/uL    Hemoglobin 13.4 (L) 14.0 - 18.0 g/dL    Hematocrit 39.4 (L) 40.0 - 54.0 %     (H) 80 - 100 fL    MCH 34.2 (H) 27.0 - 34.0 pg    MCHC 33.9 32.0 - 36.0 g/dL    RDW 10.6 (L) 11.0 - 14.5 %    Platelets 614 (H) 140 - 440 thou/uL    MPV 7.7 7.0 - 10.0 fL    Neutrophils %  68 50 - 70 %    Lymphocytes % 17 (L) 20 - 40 %    Monocytes % 9 2 - 10 %    Eosinophils % 5 0 - 6 %    Basophils % 1 0 - 2 %    Neutrophils Absolute 9.5 (H) 2.0 - 7.7 thou/uL    Lymphocytes Absolute 2.4 0.8 - 4.4 thou/uL    Monocytes Absolute 1.3 (H) 0.0 - 0.9 thou/uL    Eosinophils Absolute 0.7 (H) 0.0 - 0.4 thou/uL    Basophils Absolute 0.1 0.0 - 0.2 thou/uL   Urinalysis-UC if Indicated   Result Value Ref Range    Color, UA Yellow Colorless, Yellow, Straw, Light Yellow    Clarity, UA Clear Clear    Glucose, UA Negative Negative    Bilirubin, UA Negative Negative    Ketones, UA Negative Negative    Specific Gravity, UA 1.020 1.005 - 1.030    Blood, UA Negative Negative    pH, UA 5.5 5.0 - 8.0    Protein, UA Negative Negative mg/dL    Urobilinogen, UA 1.0 E.U./dL 0.2 E.U./dL, 1.0 E.U./dL    Nitrite, UA Negative Negative    Leukocytes, UA Negative Negative         Radiology:    Xr Chest 2 Views    Result Date: 8/19/2019  EXAM DATE:         08/19/2019 EXAM: X-RAY CHEST, 2 VIEWS, FRONTAL AND LATERAL LOCATION: [facility] DATE/TIME: 8/19/2019 10:45 AM INDICATION: weakness, cough, tachy, elevated WBCs. COMPARISON: None. FINDINGS: Modest cardiomegaly. Pulmonary vessels are normal. Lungs appear clear, no pneumonia or pleural effusion       PATIENT INSTRUCTIONS:   Patient Instructions   Oumar has some abnormal labs today indicating that he may have an infection and some mild anemia.  There is no pneumonia.  Urine, kidneys and electrolytes look good today.  I am not sure what is causing his symptoms at this time.    Oumar should drink at least 8 glasses of water or uncaffeinated beverages today.      Diet as tolerated.    He should be rechecked tomorrow in his clinic for further workup.      Oumar should go to the emergency room with high fevers at any time.      Patient Education     Weakness with Uncertain Cause  Based on your exam today, the exact cause of your weakness is not certain. But your  weakness does not seem to be a sign of a serious illness at this time. Keep an eye on your symptoms and get medical advice as instructed below.  Home care    Rest at home today. Don't over-exert yourself.    Take any medicine as prescribed.    For the next few days, drink extra fluids (unless your healthcare provider wants you to restrict fluids for other reasons). Don't skip meals.    Unless otherwise directed, continue to take any prescription medicines.    Contact your healthcare provider if you have any questions or concerns.  Follow-up care  Follow up with your healthcare provider, or as advised.  When to seek medical advice  Call your healthcare provider right away for any of the following:    Symptoms get worse    Symptoms don't start getting better within 2 days    Fever of 100.4  F (38  C) or higher, or as directed by your healthcare provider  Call 911  Call 911 for any of these:    Chest, arm, neck, jaw, or upper back pain    Trouble breathing    Numbness or weakness of the face, one arm, or one leg    Slurred speech, confusion, or trouble speaking, walking, or seeing    Blood in vomit or stool (black or red color)    Loss of consciousness    Severe headache  Date Last Reviewed: 10/1/2017    9959-8957 [employer]. [address] [place] [address], [place] [address]. All rights reserved. This information is not intended as a substitute for professional medical care. Always follow your healthcare professional's instructions.

## 2021-07-03 NOTE — ADDENDUM NOTE
Addendum Note by Tamika Sauceda FNP at 9/5/2019 10:00 AM     Author: Tamika Sauceda FNP Service: -- Author Type: Nurse Practitioner    Filed: 9/11/2019 10:10 AM Encounter Date: 9/5/2019 Status: Signed    : Tamika Sauceda FNP (Nurse Practitioner)    Addended by: TAMIKA SAUCEDA on: 9/11/2019 10:10 AM        Modules accepted: Orders

## 2021-09-16 ENCOUNTER — MEDICAL CORRESPONDENCE (OUTPATIENT)
Dept: HEALTH INFORMATION MANAGEMENT | Facility: CLINIC | Age: 71
End: 2021-09-16
Payer: COMMERCIAL

## 2021-10-12 ENCOUNTER — TRANSFERRED RECORDS (OUTPATIENT)
Dept: HEALTH INFORMATION MANAGEMENT | Facility: CLINIC | Age: 71
End: 2021-10-12
Payer: COMMERCIAL

## 2021-11-01 DIAGNOSIS — E78.2 MIXED HYPERLIPIDEMIA: ICD-10-CM

## 2021-11-01 DIAGNOSIS — I10 ESSENTIAL HYPERTENSION: ICD-10-CM

## 2021-11-02 RX ORDER — ATORVASTATIN CALCIUM 20 MG/1
TABLET, FILM COATED ORAL
Qty: 90 TABLET | Refills: 1 | Status: SHIPPED | OUTPATIENT
Start: 2021-11-02 | End: 2022-05-06

## 2021-11-02 RX ORDER — METOPROLOL SUCCINATE 100 MG/1
TABLET, EXTENDED RELEASE ORAL
Qty: 90 TABLET | Refills: 1 | Status: SHIPPED | OUTPATIENT
Start: 2021-11-02 | End: 2022-05-06

## 2021-11-02 RX ORDER — LISINOPRIL 40 MG/1
TABLET ORAL
Qty: 90 TABLET | Refills: 1 | Status: SHIPPED | OUTPATIENT
Start: 2021-11-02 | End: 2022-05-06

## 2021-11-02 NOTE — TELEPHONE ENCOUNTER
"Routing refill request to provider for review/approval because:  Labs not current:  multiple    Last Written Prescription Date:  4/22/21  Last Fill Quantity: 90,  # refills: 1   Last office visit provider:  4/22/21     Requested Prescriptions   Pending Prescriptions Disp Refills     lisinopril (ZESTRIL) 40 MG tablet [Pharmacy Med Name: LISINOPRIL 40 MG TABS 40 Tablet] 90 tablet 1     Sig: TAKE ONE TABLET BY MOUTH ONCE DAILY.       ACE Inhibitors (Including Combos) Protocol Failed - 11/1/2021 10:31 AM        Failed - Normal serum creatinine on file in past 12 months     Recent Labs   Lab Test 10/20/20  1019   CR 1.21       Ok to refill medication if creatinine is low          Failed - Normal serum potassium on file in past 12 months     Recent Labs   Lab Test 10/20/20  1019   POTASSIUM 4.7             Passed - Blood pressure under 140/90 in past 12 months     BP Readings from Last 3 Encounters:   04/22/21 130/64   10/20/20 124/62   10/09/19 128/47                 Passed - Recent (12 mo) or future (30 days) visit within the authorizing provider's specialty     Patient has had an office visit with the authorizing provider or a provider within the authorizing providers department within the previous 12 mos or has a future within next 30 days. See \"Patient Info\" tab in inbasket, or \"Choose Columns\" in Meds & Orders section of the refill encounter.              Passed - Medication is active on med list        Passed - Patient is age 18 or older           atorvastatin (LIPITOR) 20 MG tablet [Pharmacy Med Name: ATORVASTATIN 20 MG TABLET 20 Tablet] 90 tablet 1     Sig: TAKE 1 TABLET (20 MG TOTAL) BY MOUTH DAILY.       Statins Protocol Failed - 11/1/2021 10:31 AM        Failed - LDL on file in past 12 months     Recent Labs   Lab Test 10/20/20  1019   LDL 52             Passed - No abnormal creatine kinase in past 12 months     No lab results found.             Passed - Recent (12 mo) or future (30 days) visit within the " "authorizing provider's specialty     Patient has had an office visit with the authorizing provider or a provider within the authorizing providers department within the previous 12 mos or has a future within next 30 days. See \"Patient Info\" tab in inbasket, or \"Choose Columns\" in Meds & Orders section of the refill encounter.              Passed - Medication is active on med list        Passed - Patient is age 18 or older         Signed Prescriptions Disp Refills    metoprolol succinate ER (TOPROL-XL) 100 MG 24 hr tablet 90 tablet 1     Sig: TAKE 1 TABLET (100 MG TOTAL) BY MOUTH DAILY.       Beta-Blockers Protocol Passed - 11/1/2021 10:31 AM        Passed - Blood pressure under 140/90 in past 12 months     BP Readings from Last 3 Encounters:   04/22/21 130/64   10/20/20 124/62   10/09/19 128/47                 Passed - Patient is age 6 or older        Passed - Recent (12 mo) or future (30 days) visit within the authorizing provider's specialty     Patient has had an office visit with the authorizing provider or a provider within the authorizing providers department within the previous 12 mos or has a future within next 30 days. See \"Patient Info\" tab in inbasket, or \"Choose Columns\" in Meds & Orders section of the refill encounter.              Passed - Medication is active on med list             Leanne Wiggins RN 11/02/21 11:51 AM  "

## 2021-11-02 NOTE — TELEPHONE ENCOUNTER
"Last Written Prescription Date:  4/22/21  Last Fill Quantity: 90,  # refills: 1   Last office visit provider:  4/22/21     Requested Prescriptions   Pending Prescriptions Disp Refills     lisinopril (ZESTRIL) 40 MG tablet [Pharmacy Med Name: LISINOPRIL 40 MG TABS 40 Tablet] 90 tablet 1     Sig: TAKE ONE TABLET BY MOUTH ONCE DAILY.       ACE Inhibitors (Including Combos) Protocol Failed - 11/1/2021 10:31 AM        Failed - Normal serum creatinine on file in past 12 months     Recent Labs   Lab Test 10/20/20  1019   CR 1.21       Ok to refill medication if creatinine is low          Failed - Normal serum potassium on file in past 12 months     Recent Labs   Lab Test 10/20/20  1019   POTASSIUM 4.7             Passed - Blood pressure under 140/90 in past 12 months     BP Readings from Last 3 Encounters:   04/22/21 130/64   10/20/20 124/62   10/09/19 128/47                 Passed - Recent (12 mo) or future (30 days) visit within the authorizing provider's specialty     Patient has had an office visit with the authorizing provider or a provider within the authorizing providers department within the previous 12 mos or has a future within next 30 days. See \"Patient Info\" tab in inbasket, or \"Choose Columns\" in Meds & Orders section of the refill encounter.              Passed - Medication is active on med list        Passed - Patient is age 18 or older           metoprolol succinate ER (TOPROL-XL) 100 MG 24 hr tablet [Pharmacy Med Name: METOPROLOL SUCC  MG T 100 Tablet] 90 tablet 1     Sig: TAKE 1 TABLET (100 MG TOTAL) BY MOUTH DAILY.       Beta-Blockers Protocol Passed - 11/1/2021 10:31 AM        Passed - Blood pressure under 140/90 in past 12 months     BP Readings from Last 3 Encounters:   04/22/21 130/64   10/20/20 124/62   10/09/19 128/47                 Passed - Patient is age 6 or older        Passed - Recent (12 mo) or future (30 days) visit within the authorizing provider's specialty     Patient has had an " "office visit with the authorizing provider or a provider within the authorizing providers department within the previous 12 mos or has a future within next 30 days. See \"Patient Info\" tab in inbasket, or \"Choose Columns\" in Meds & Orders section of the refill encounter.              Passed - Medication is active on med list           atorvastatin (LIPITOR) 20 MG tablet [Pharmacy Med Name: ATORVASTATIN 20 MG TABLET 20 Tablet] 90 tablet 1     Sig: TAKE 1 TABLET (20 MG TOTAL) BY MOUTH DAILY.       Statins Protocol Failed - 11/1/2021 10:31 AM        Failed - LDL on file in past 12 months     Recent Labs   Lab Test 10/20/20  1019   LDL 52             Passed - No abnormal creatine kinase in past 12 months     No lab results found.             Passed - Recent (12 mo) or future (30 days) visit within the authorizing provider's specialty     Patient has had an office visit with the authorizing provider or a provider within the authorizing providers department within the previous 12 mos or has a future within next 30 days. See \"Patient Info\" tab in inbasket, or \"Choose Columns\" in Meds & Orders section of the refill encounter.              Passed - Medication is active on med list        Passed - Patient is age 18 or older             Leanne Wiggins RN 11/02/21 11:51 AM  "

## 2022-06-04 DIAGNOSIS — E78.2 MIXED HYPERLIPIDEMIA: ICD-10-CM

## 2022-06-04 DIAGNOSIS — I10 ESSENTIAL HYPERTENSION: ICD-10-CM

## 2022-06-05 NOTE — TELEPHONE ENCOUNTER
"Routing refill request to provider for review/approval because:  Patient needs to be seen because it has been more than 1 year since last office visit.    Last Written Prescription Date:  5/6/22  Last Fill Quantity: 30,  # refills: 0   Last office visit provider:  4/22/21     Requested Prescriptions   Pending Prescriptions Disp Refills     metoprolol succinate ER (TOPROL XL) 100 MG 24 hr tablet [Pharmacy Med Name: METOPROLOL SUCC  MG T 100 Tablet] 30 tablet 0     Sig: TAKE 1 TABLET (100 MG) BY MOUTH DAILY DUE FOR APPOINTMENT       Beta-Blockers Protocol Failed - 6/4/2022  9:18 AM        Failed - Blood pressure under 140/90 in past 12 months     BP Readings from Last 3 Encounters:   04/22/21 130/64   10/20/20 124/62   10/09/19 128/47                 Passed - Patient is age 6 or older        Passed - Recent (12 mo) or future (30 days) visit within the authorizing provider's specialty     Patient has had an office visit with the authorizing provider or a provider within the authorizing providers department within the previous 12 mos or has a future within next 30 days. See \"Patient Info\" tab in inbasket, or \"Choose Columns\" in Meds & Orders section of the refill encounter.              Passed - Medication is active on med list           atorvastatin (LIPITOR) 20 MG tablet [Pharmacy Med Name: ATORVASTATIN 20 MG TABLET 20 Tablet] 30 tablet 0     Sig: TAKE 1 TABLET (20 MG) BY MOUTH DAILY DUE FOR APPOINTMENT       Statins Protocol Failed - 6/4/2022  9:18 AM        Failed - LDL on file in past 12 months     Recent Labs   Lab Test 10/20/20  1019   LDL 52             Passed - No abnormal creatine kinase in past 12 months     No lab results found.             Passed - Recent (12 mo) or future (30 days) visit within the authorizing provider's specialty     Patient has had an office visit with the authorizing provider or a provider within the authorizing providers department within the previous 12 mos or has a future " "within next 30 days. See \"Patient Info\" tab in inbasket, or \"Choose Columns\" in Meds & Orders section of the refill encounter.              Passed - Medication is active on med list        Passed - Patient is age 18 or older           lisinopril (ZESTRIL) 40 MG tablet [Pharmacy Med Name: LISINOPRIL 40 MG TABS 40 Tablet] 30 tablet 0     Sig: TAKE 1 TABLET (40 MG) BY MOUTH DAILY DUE FOR APPOINTMENT       ACE Inhibitors (Including Combos) Protocol Failed - 6/4/2022  9:18 AM        Failed - Blood pressure under 140/90 in past 12 months     BP Readings from Last 3 Encounters:   04/22/21 130/64   10/20/20 124/62   10/09/19 128/47                 Failed - Normal serum creatinine on file in past 12 months     Recent Labs   Lab Test 10/20/20  1019   CR 1.21       Ok to refill medication if creatinine is low          Failed - Normal serum potassium on file in past 12 months     Recent Labs   Lab Test 10/20/20  1019   POTASSIUM 4.7             Passed - Recent (12 mo) or future (30 days) visit within the authorizing provider's specialty     Patient has had an office visit with the authorizing provider or a provider within the authorizing providers department within the previous 12 mos or has a future within next 30 days. See \"Patient Info\" tab in inbasket, or \"Choose Columns\" in Meds & Orders section of the refill encounter.              Passed - Medication is active on med list        Passed - Patient is age 18 or older             Maria Luisa Harrison RN 06/05/22 4:28 PM  "

## 2022-06-08 RX ORDER — METOPROLOL SUCCINATE 100 MG/1
100 TABLET, EXTENDED RELEASE ORAL DAILY
Qty: 30 TABLET | Refills: 0 | Status: SHIPPED | OUTPATIENT
Start: 2022-06-08 | End: 2022-06-23

## 2022-06-08 RX ORDER — LISINOPRIL 40 MG/1
40 TABLET ORAL DAILY
Qty: 30 TABLET | Refills: 0 | Status: SHIPPED | OUTPATIENT
Start: 2022-06-08 | End: 2022-06-23

## 2022-06-08 RX ORDER — ATORVASTATIN CALCIUM 20 MG/1
20 TABLET, FILM COATED ORAL DAILY
Qty: 30 TABLET | Refills: 0 | Status: SHIPPED | OUTPATIENT
Start: 2022-06-08 | End: 2022-06-23

## 2022-06-23 ENCOUNTER — OFFICE VISIT (OUTPATIENT)
Dept: INTERNAL MEDICINE | Facility: CLINIC | Age: 72
End: 2022-06-23
Payer: COMMERCIAL

## 2022-06-23 VITALS
BODY MASS INDEX: 34.93 KG/M2 | WEIGHT: 204.6 LBS | TEMPERATURE: 97.9 F | DIASTOLIC BLOOD PRESSURE: 68 MMHG | HEIGHT: 64 IN | RESPIRATION RATE: 20 BRPM | SYSTOLIC BLOOD PRESSURE: 120 MMHG | HEART RATE: 97 BPM | OXYGEN SATURATION: 100 %

## 2022-06-23 DIAGNOSIS — Z12.11 COLON CANCER SCREENING: ICD-10-CM

## 2022-06-23 DIAGNOSIS — D72.821 MONOCYTOSIS: ICD-10-CM

## 2022-06-23 DIAGNOSIS — R73.03 PREDIABETES: ICD-10-CM

## 2022-06-23 DIAGNOSIS — I10 ESSENTIAL HYPERTENSION: Primary | ICD-10-CM

## 2022-06-23 DIAGNOSIS — I10 ESSENTIAL HYPERTENSION: ICD-10-CM

## 2022-06-23 DIAGNOSIS — E66.01 MORBID OBESITY (H): ICD-10-CM

## 2022-06-23 DIAGNOSIS — E78.2 MIXED HYPERLIPIDEMIA: ICD-10-CM

## 2022-06-23 DIAGNOSIS — F70 MILD INTELLECTUAL DISABILITIES: ICD-10-CM

## 2022-06-23 LAB
ALT SERPL W P-5'-P-CCNC: 26 U/L (ref 0–45)
ANION GAP SERPL CALCULATED.3IONS-SCNC: 14 MMOL/L (ref 5–18)
BUN SERPL-MCNC: 19 MG/DL (ref 8–28)
CALCIUM SERPL-MCNC: 10 MG/DL (ref 8.5–10.5)
CHLORIDE BLD-SCNC: 101 MMOL/L (ref 98–107)
CHOLEST SERPL-MCNC: 132 MG/DL
CO2 SERPL-SCNC: 23 MMOL/L (ref 22–31)
CREAT SERPL-MCNC: 1.27 MG/DL (ref 0.7–1.3)
FASTING STATUS PATIENT QL REPORTED: NO
GFR SERPL CREATININE-BSD FRML MDRD: 60 ML/MIN/1.73M2
GLUCOSE BLD-MCNC: 111 MG/DL (ref 70–125)
HBA1C MFR BLD: 6.1 % (ref 0–5.6)
HDLC SERPL-MCNC: 40 MG/DL
LDLC SERPL CALC-MCNC: 40 MG/DL
POTASSIUM BLD-SCNC: 5.1 MMOL/L (ref 3.5–5)
SODIUM SERPL-SCNC: 138 MMOL/L (ref 136–145)
TRIGL SERPL-MCNC: 260 MG/DL

## 2022-06-23 PROCEDURE — 36415 COLL VENOUS BLD VENIPUNCTURE: CPT | Performed by: NURSE PRACTITIONER

## 2022-06-23 PROCEDURE — 83036 HEMOGLOBIN GLYCOSYLATED A1C: CPT | Performed by: NURSE PRACTITIONER

## 2022-06-23 PROCEDURE — 85027 COMPLETE CBC AUTOMATED: CPT | Performed by: NURSE PRACTITIONER

## 2022-06-23 PROCEDURE — 80048 BASIC METABOLIC PNL TOTAL CA: CPT | Performed by: NURSE PRACTITIONER

## 2022-06-23 PROCEDURE — 80061 LIPID PANEL: CPT | Performed by: NURSE PRACTITIONER

## 2022-06-23 PROCEDURE — 84460 ALANINE AMINO (ALT) (SGPT): CPT | Performed by: NURSE PRACTITIONER

## 2022-06-23 PROCEDURE — 99214 OFFICE O/P EST MOD 30 MIN: CPT | Performed by: NURSE PRACTITIONER

## 2022-06-23 RX ORDER — LISINOPRIL 40 MG/1
40 TABLET ORAL DAILY
Qty: 90 TABLET | Refills: 2 | Status: SHIPPED | OUTPATIENT
Start: 2022-06-23 | End: 2023-03-30

## 2022-06-23 RX ORDER — METOPROLOL SUCCINATE 100 MG/1
100 TABLET, EXTENDED RELEASE ORAL DAILY
Qty: 90 TABLET | Refills: 2 | Status: SHIPPED | OUTPATIENT
Start: 2022-06-23 | End: 2023-03-30

## 2022-06-23 RX ORDER — ATORVASTATIN CALCIUM 20 MG/1
20 TABLET, FILM COATED ORAL DAILY
Qty: 90 TABLET | Refills: 3 | Status: SHIPPED | OUTPATIENT
Start: 2022-06-23 | End: 2023-07-05

## 2022-06-23 NOTE — PROGRESS NOTES
Internal Medicine Office Visit  [facility]   Patient Name: Oumar Dallas  Patient Age: 72 year old  YOB: 1950  MRN: [id number]    Date of Visit: 6/23/2022  Patient presents with:  Recheck Medication  Discuss: Niece states discuss proxy since mother is in her 90s           Assessment / Plan / Medical Decision Making:    Problem List Items Addressed This Visit        Digestive    Obesity (BMI 35.0-39.9) with comorbidity (H)     Encouraged regular physical activity aiming for 30 minutes of exercise daily              Endocrine    HLD (hyperlipidemia), ASCVD risk 19.1% 6/2016     Repeat lipid panel today  Continue statin           Relevant Medications    atorvastatin (LIPITOR) 20 MG tablet    Other Relevant Orders    Lipid panel reflex to direct LDL Non-fasting (Completed)    ALT (Completed)    Prediabetes     Healthy diet encouraged.  Also encouraged a dental appointment in light of effects of glucose on dental caries           Relevant Orders    Hemoglobin A1c (Completed)       Circulatory    Hypertension - Primary     Stable           Relevant Medications    metoprolol succinate ER (TOPROL XL) 100 MG 24 hr tablet    lisinopril (ZESTRIL) 40 MG tablet    Other Relevant Orders    Basic metabolic panel (Completed)       Immune    Monocytosis    Relevant Orders    CBC with platelets       Behavioral    Mild intellectual disabilities     Independent with ADLs and most IADLs.  Supportive family that provide assistance some IADLs as needed. They are encouraged to seek legal advice regarding assigning guardianship. Will have Ricky sign a consent to communicate today so that we can communicate with his sister and niece.  They were provided with information regarding setting up a proxy [employer] account so that information can be reviewed in his medical record.             Other Visit Diagnoses     Colon cancer screening        Relevant Orders    Fecal colorectal cancer screen (FIT)            I have changed Oumar Dallas's metoprolol succinate ER, lisinopril, and atorvastatin.          Orders Placed This Encounter   Procedures     Fecal colorectal cancer screen (FIT)     Lipid panel reflex to direct LDL Non-fasting     Basic metabolic panel     ALT     Hemoglobin A1c     CBC with platelets   Followup: Return in about 6 months (around 12/23/2022) for Follow up. earlier if needed.      Tamika Sauceda NP, CNP        HPI:  Oumar Dallas is a 72 year old year old who presents to the office today with his niece, Gill, and mother, Isa for follow-up.    Ricky has a history of mild intellectual disabilities.  He continues to live with his mother and is independent in ADLs but has assistance with IADLs.  His family has become concerned that because his mother is 96 years old, they need to have succession plans in place for the long-term care of Mr. Dallas. They do not notice deterioration in cognitive abilities. In general, he is quite regimented in his daily activities and can become easily upset if his routines are disrupted.  He requires prompts and assistance in making appointments or changing his clothes or any other activity that is not specifically important to him.  As an example, his mother is concerned that he has several broken teeth and likely dental caries but he has been resistant to scheduling a dental appointment.  She requests that I help to push this issue.  It sounds like the family is going to shift some of the responsibility of helping to manage his medical appointments to his sister, Lucas, and niece, Gill.  In addition, they would like to be sure that they are abreast of the medical conditions he is presently treated for so that they could act on his behalf should anything be necessary in the future.    We reviewed his history of hypertension.  He takes 2 medications for this, lisinopril and metoprolol.  He is not experiencing any lightheadedness or dizziness associated with the  medications.  He denies chest pains or shortness of breath.  He has a daily stretching and calisthenic routine although he does not like to walk outside for exercise.    He has a history of prediabetes.  This is not specifically treated with any medications.  We have discussed his diet in the past.    He has a history of leukocytosis and thrombocytosis in the past.  He saw hematology for this and on the date of his visit his labs returned in a normal range.  He was advised to follow-up if he develops any other abnormalities in his blood counts in the future.  We will plan to recheck this today.      Health Maintenance Review  Health Maintenance   Topic Date Due     ANNUAL REVIEW OF HM ORDERS  Never done     ADVANCE CARE PLANNING  Never done     MEDICARE ANNUAL WELLNESS VISIT  Never done     COLORECTAL CANCER SCREENING  09/09/2021     PHQ-2 (once per calendar year)  Never done     ZOSTER IMMUNIZATION (1 of 2) 06/30/2022 (Originally 4/12/2000)     COVID-19 Vaccine (4 - Booster for Moderna series) 06/30/2022 (Originally 3/3/2022)     DTAP/TDAP/TD IMMUNIZATION (2 - Td or Tdap) 09/26/2022     FALL RISK ASSESSMENT  06/23/2023     LIPID  06/23/2027     INFLUENZA VACCINE  Completed     Pneumococcal Vaccine: 65+ Years  Completed     IPV IMMUNIZATION  Aged Out     MENINGITIS IMMUNIZATION  Aged Out     HEPATITIS B IMMUNIZATION  Aged Out     HEPATITIS C SCREENING  Discontinued     AORTIC ANEURYSM SCREENING (SYSTEM ASSIGNED)  Discontinued       Current Scheduled Meds:  Outpatient Encounter Medications as of 6/23/2022   Medication Sig Dispense Refill     atorvastatin (LIPITOR) 20 MG tablet Take 1 tablet (20 mg) by mouth daily 90 tablet 3     lisinopril (ZESTRIL) 40 MG tablet Take 1 tablet (40 mg) by mouth daily 90 tablet 2     metoprolol succinate ER (TOPROL XL) 100 MG 24 hr tablet Take 1 tablet (100 mg) by mouth daily 90 tablet 2     [DISCONTINUED] atorvastatin (LIPITOR) 20 MG tablet TAKE 1 TABLET (20 MG) BY MOUTH DAILY DUE FOR  "APPOINTMENT 30 tablet 0     [DISCONTINUED] lisinopril (ZESTRIL) 40 MG tablet TAKE 1 TABLET (40 MG) BY MOUTH DAILY DUE FOR APPOINTMENT 30 tablet 0     [DISCONTINUED] metoprolol succinate ER (TOPROL XL) 100 MG 24 hr tablet TAKE 1 TABLET (100 MG) BY MOUTH DAILY DUE FOR APPOINTMENT 30 tablet 0     No facility-administered encounter medications on file as of 6/23/2022.         Objective / Physical Examination:  Vitals:    06/23/22 0932   BP: 120/68   BP Location: Right arm   Patient Position: Sitting   Cuff Size: Adult Regular   Pulse: 97   Resp: 20   Temp: 97.9  F (36.6  C)   TempSrc: Oral   SpO2: 100%   Weight: 92.8 kg (204 lb 9.6 oz)   Height: 1.632 m (5' 4.25\")     Wt Readings from Last 3 Encounters:   06/23/22 92.8 kg (204 lb 9.6 oz)   04/22/21 97.1 kg (214 lb)   10/20/20 97.5 kg (215 lb)     Body mass index is 34.85 kg/m .     Constitutional: In no apparent distress.  Obese.  Respiratory: Clear to auscultation bilaterally. Normal inspiratory and expiratory effort  Cardiovascular: Regular rate and rhythm. No murmurs, rubs, or gallops. No edema.   Gastrointestinal: Bowel sounds active all four quadrants. Soft, non-tender.       "

## 2022-06-23 NOTE — LETTER
June 23, 2022      Oumar BENEDICT Dallas  [address] [address]        Dear ,    We are writing to inform you of your test results.    Here is a copy of your lab results.  Cholesterol levels look good.  Continue with cholesterol medication.    A1c is stable and continues to show prediabetes.  Remember to try to get 30 minutes of exercise most days of the week such as walking, riding a bike, marching in place.    Electrolytes are normal except for a slightly elevated potassium level.  We will plan to recheck this at your next appointment in December.    The liver enzyme test is normal.    Resulted Orders   Lipid panel reflex to direct LDL Non-fasting   Result Value Ref Range    Cholesterol 132 <=199 mg/dL    Triglycerides 260 (H) <=149 mg/dL    Direct Measure HDL 40 >=40 mg/dL      Comment:      HDL Cholesterol Reference Range:     0-2 years:   No reference ranges established for patients under 2 years old  at [facility] [employer] for lipid analytes.    2-8 years:  Greater than 45 mg/dL     18 years and older:   Female: Greater than or equal to 50 mg/dL   Male:   Greater than or equal to 40 mg/dL    LDL Cholesterol Calculated 40 <=129 mg/dL    Patient Fasting > 8hrs? No    Basic metabolic panel   Result Value Ref Range    Sodium 138 136 - 145 mmol/L    Potassium 5.1 (H) 3.5 - 5.0 mmol/L    Chloride 101 98 - 107 mmol/L    Carbon Dioxide (CO2) 23 22 - 31 mmol/L    Anion Gap 14 5 - 18 mmol/L    Urea Nitrogen 19 8 - 28 mg/dL    Creatinine 1.27 0.70 - 1.30 mg/dL    Calcium 10.0 8.5 - 10.5 mg/dL    Glucose 111 70 - 125 mg/dL    GFR Estimate 60 (L) >60 mL/min/1.73m2      Comment:      Effective December 21, 2021 eGFRcr in adults is calculated using the 2021 CKD-EPI creatinine equation which includes age and gender (Alfreda beavers al., NEJM, DOI: 10.1056/BLXOtx9242443)   ALT   Result Value Ref Range    ALT 26 0 - 45 U/L   Hemoglobin A1c   Result Value Ref Range    Hemoglobin A1C 6.1 (H) 0.0 - 5.6 %      Comment:       Normal <5.7%   Prediabetes 5.7-6.4%    Diabetes 6.5% or higher     Note: Adopted from ADA consensus guidelines.       If you have any questions or concerns, please call the clinic at the number listed above.       Sincerely,      Tamika Sauceda NP

## 2022-06-24 LAB
ERYTHROCYTE [DISTWIDTH] IN BLOOD BY AUTOMATED COUNT: 12.8 % (ref 10–15)
HCT VFR BLD AUTO: 45.1 % (ref 40–53)
HGB BLD-MCNC: 14.8 G/DL (ref 13.3–17.7)
MCH RBC QN AUTO: 33.9 PG (ref 26.5–33)
MCHC RBC AUTO-ENTMCNC: 32.8 G/DL (ref 31.5–36.5)
MCV RBC AUTO: 103 FL (ref 78–100)
PLATELET # BLD AUTO: 400 10E3/UL (ref 150–450)
RBC # BLD AUTO: 4.37 10E6/UL (ref 4.4–5.9)
WBC # BLD AUTO: 11.8 10E3/UL (ref 4–11)

## 2022-06-24 PROCEDURE — 82274 ASSAY TEST FOR BLOOD FECAL: CPT | Performed by: NURSE PRACTITIONER

## 2022-06-24 NOTE — ASSESSMENT & PLAN NOTE
Healthy diet encouraged.  Also encouraged a dental appointment in light of effects of glucose on dental caries

## 2022-06-24 NOTE — ASSESSMENT & PLAN NOTE
Independent with ADLs and most IADLs.  Supportive family that provide assistance some IADLs as needed. They are encouraged to seek legal advice regarding assigning guardianship. Will have Ricky sign a consent to communicate today so that we can communicate with his sister and niece.  They were provided with information regarding setting up a proxy MyChart account so that information can be reviewed in his medical record.

## 2022-06-27 LAB — HEMOCCULT STL QL IA: NEGATIVE

## 2022-07-17 ENCOUNTER — HEALTH MAINTENANCE LETTER (OUTPATIENT)
Age: 72
End: 2022-07-17

## 2022-09-25 ENCOUNTER — HEALTH MAINTENANCE LETTER (OUTPATIENT)
Age: 72
End: 2022-09-25

## 2022-12-28 ENCOUNTER — OFFICE VISIT (OUTPATIENT)
Dept: INTERNAL MEDICINE | Facility: CLINIC | Age: 72
End: 2022-12-28
Payer: COMMERCIAL

## 2022-12-28 VITALS
WEIGHT: 202 LBS | HEIGHT: 64 IN | BODY MASS INDEX: 34.49 KG/M2 | SYSTOLIC BLOOD PRESSURE: 128 MMHG | HEART RATE: 89 BPM | TEMPERATURE: 97.9 F | DIASTOLIC BLOOD PRESSURE: 60 MMHG | RESPIRATION RATE: 20 BRPM | OXYGEN SATURATION: 98 %

## 2022-12-28 DIAGNOSIS — E78.49 OTHER HYPERLIPIDEMIA: ICD-10-CM

## 2022-12-28 DIAGNOSIS — R73.03 PREDIABETES: ICD-10-CM

## 2022-12-28 DIAGNOSIS — Z00.00 ENCOUNTER FOR MEDICARE ANNUAL WELLNESS EXAM: Primary | ICD-10-CM

## 2022-12-28 DIAGNOSIS — E66.01 MORBID OBESITY (H): ICD-10-CM

## 2022-12-28 DIAGNOSIS — F70 MILD INTELLECTUAL DISABILITIES: ICD-10-CM

## 2022-12-28 DIAGNOSIS — I10 ESSENTIAL HYPERTENSION: ICD-10-CM

## 2022-12-28 PROCEDURE — 99213 OFFICE O/P EST LOW 20 MIN: CPT | Mod: 25 | Performed by: NURSE PRACTITIONER

## 2022-12-28 PROCEDURE — G0439 PPPS, SUBSEQ VISIT: HCPCS | Performed by: NURSE PRACTITIONER

## 2022-12-28 ASSESSMENT — ENCOUNTER SYMPTOMS
CONSTIPATION: 0
EYE PAIN: 0
WEAKNESS: 0
ABDOMINAL PAIN: 0
DIZZINESS: 0
HEADACHES: 0
PALPITATIONS: 0
SHORTNESS OF BREATH: 0
HEMATURIA: 0
ARTHRALGIAS: 0
DIARRHEA: 0
NERVOUS/ANXIOUS: 0
DYSURIA: 0
COUGH: 0
SORE THROAT: 0
HEMATOCHEZIA: 0
CHILLS: 0
NAUSEA: 0
PARESTHESIAS: 0
FEVER: 0
HEARTBURN: 0
FREQUENCY: 0
MYALGIAS: 0
JOINT SWELLING: 0

## 2022-12-28 ASSESSMENT — ACTIVITIES OF DAILY LIVING (ADL): CURRENT_FUNCTION: TRANSPORTATION REQUIRES ASSISTANCE

## 2022-12-28 ASSESSMENT — PAIN SCALES - GENERAL: PAINLEVEL: NO PAIN (0)

## 2022-12-28 NOTE — PATIENT INSTRUCTIONS
Patient Education   Personalized Prevention Plan  You are due for the preventive services outlined below.  Your care team is available to assist you in scheduling these services.  If you have already completed any of these items, please share that information with your care team to update in your medical record.  Health Maintenance Due   Topic Date Due     ANNUAL REVIEW OF HM ORDERS  Never done     Zoster (Shingles) Vaccine (1 of 2) Never done     Annual Wellness Visit  Never done     Diptheria Tetanus Pertussis (DTAP/TDAP/TD) Vaccine (2 - Td or Tdap) 09/26/2022

## 2022-12-28 NOTE — PROGRESS NOTES
"SUBJECTIVE:   Oumar is a 72 year old who presents for Preventive Visit.  He is accompanied today by his sister and mother.    He just recently saw the dentist and he has been comfortable going there. His oral hygiene has improved now that he has more comfort with the dentist he is seeing.     HTN- no chest pain, dizziness, lightheadedness.     Patient has been advised of split billing requirements and indicates understanding: Yes  Are you in the first 12 months of your Medicare coverage?  No    Healthy Habits:     In general, how would you rate your overall health?  Good    Frequency of exercise:  6-7 days/week    Duration of exercise:  30-45 minutes    Do you usually eat at least 4 servings of fruit and vegetables a day, include whole grains    & fiber and avoid regularly eating high fat or \"junk\" foods?  Yes    Taking medications regularly:  Yes    Medication side effects:  Not applicable    Ability to successfully perform activities of daily living:  Transportation requires assistance    Home Safety:  No safety concerns identified    Hearing Impairment:  No hearing concerns    In the past 6 months, have you been bothered by leaking of urine?  No    In general, how would you rate your overall mental or emotional health?  Good      PHQ-2 Total Score: 0    Additional concerns today:  No        Have you ever done Advance Care Planning? (For example, a Health Directive, POLST, or a discussion with a medical provider or your loved ones about your wishes): No, advance care planning information given to patient to review.  Advanced care planning was discussed at today's visit.       Fall risk  Fallen 2 or more times in the past year?: No  Any fall with injury in the past year?: No    Cognitive Screening-- baseline intellectual disability   1) Repeat 3 items (Leader, Season, Table)    2) Clock draw: NORMAL  3) 3 item recall: Recalls 2 objects   Results: NORMAL clock, 1-2 items recalled: COGNITIVE IMPAIRMENT LESS " "LIKELY    Mini-CogTM Copyright S Deandra. Licensed by the author for use in [facility]; reprinted with permission (eden@.[facility]). All rights reserved.      Do you have sleep apnea, excessive snoring or daytime drowsiness?: no    Reviewed and updated as needed this visit by clinical staff   Tobacco  Allergies  Meds  Problems  Med Hx  Surg Hx  Fam Hx          Reviewed and updated as needed this visit by Provider   Tobacco  Allergies  Meds  Problems  Med Hx  Surg Hx  Fam Hx         Social History     Tobacco Use     Smoking status: Never     Smokeless tobacco: Never   Substance Use Topics     Alcohol use: No         Alcohol Use 12/28/2022   Prescreen: >3 drinks/day or >7 drinks/week? No       Current providers sharing in care for this patient include:   Patient Care Team:  Tamika Sauceda NP as PCP - General  Tamika Sauceda NP as Assigned PCP    The following health maintenance items are reviewed in Epic and correct as of today:  Health Maintenance   Topic Date Due     ANNUAL REVIEW OF HM ORDERS  Never done     ZOSTER IMMUNIZATION (1 of 2) Never done     PHQ-2 (once per calendar year)  01/01/2023     DTAP/TDAP/TD IMMUNIZATION (2 - Td or Tdap) 12/28/2023 (Originally 9/26/2022)     COLORECTAL CANCER SCREENING  06/24/2023     MEDICARE ANNUAL WELLNESS VISIT  12/28/2023     FALL RISK ASSESSMENT  12/28/2023     LIPID  06/23/2027     ADVANCE CARE PLANNING  12/28/2027     INFLUENZA VACCINE  Completed     Pneumococcal Vaccine: 65+ Years  Completed     COVID-19 Vaccine  Completed     IPV IMMUNIZATION  Aged Out     MENINGITIS IMMUNIZATION  Aged Out     HEPATITIS C SCREENING  Discontinued     AORTIC ANEURYSM SCREENING (SYSTEM ASSIGNED)  Discontinued       OBJECTIVE:   /60 (BP Location: Right arm, Patient Position: Sitting, Cuff Size: Adult Regular)   Pulse 89   Temp 97.9  F (36.6  C) (Oral)   Resp 20   Ht 1.632 m (5' 4.25\")   Wt 91.6 kg (202 lb)   SpO2 98%   BMI 34.40 kg/m   Estimated body mass " "index is 34.4 kg/m  as calculated from the following:    Height as of this encounter: 1.632 m (5' 4.25\").    Weight as of this encounter: 91.6 kg (202 lb).     Wt Readings from Last 5 Encounters:   12/28/22 91.6 kg (202 lb)   06/23/22 92.8 kg (204 lb 9.6 oz)   04/22/21 97.1 kg (214 lb)   10/20/20 97.5 kg (215 lb)   10/09/19 94.8 kg (209 lb)       Physical Exam  GENERAL: alert and no distress  EYES: Eyes grossly normal to inspection, conjunctivae and sclerae normal  HENT: right ear canal occluded by cerumen, left ear canal and TM normal.   RESP: lungs clear to auscultation - no rales, rhonchi or wheezes  CV: regular rate and rhythm, normal S1 S2, no S3 or S4, no murmur, click or rub, no peripheral edema and peripheral pulses strong  MS: no gross musculoskeletal defects noted, no edema  NEURO: Normal strength and tone  PSYCH: Affect normal/bright      ASSESSMENT / PLAN:     Problem List Items Addressed This Visit        Digestive    Obesity (BMI 35.0-39.9) with comorbidity (H)     He lost a little weight since last year with smaller portions. Encouraged further weight loss and regular exercise             Endocrine    HLD (hyperlipidemia), ASCVD risk 19.1% 6/2016     Repeat lipids and ALT level  Continue atorvastatin          Prediabetes     Stable, A1C 6.1%  Encouraged him to continue regular physical activity and reduce simple carbohydrates             Circulatory    Hypertension     Stable             Behavioral    Mild intellectual disabilities   Other Visit Diagnoses     Encounter for Medicare annual wellness exam    -  Primary            COUNSELING:  Reviewed preventive health counseling, as reflected in patient instructions       Regular exercise       Healthy diet/nutrition      BMI:   Estimated body mass index is 34.4 kg/m  as calculated from the following:    Height as of this encounter: 1.632 m (5' 4.25\").    Weight as of this encounter: 91.6 kg (202 lb).   Weight management plan: Discussed healthy diet and " exercise guidelines      He reports that he has never smoked. He has never used smokeless tobacco.      Appropriate preventive services were discussed with this patient, including applicable screening as appropriate for cardiovascular disease, diabetes, osteopenia/osteoporosis, and glaucoma.  As appropriate for age/gender, discussed screening for colorectal cancer, prostate cancer, breast cancer, and cervical cancer. Checklist reviewing preventive services available has been given to the patient.    Reviewed patients plan of care and provided an AVS. The Basic Care Plan (routine screening as documented in Health Maintenance) for Oumar meets the Care Plan requirement. This Care Plan has been established and reviewed with the Patient and mother and sister.      Tamika Sauceda NP  [facility]    Identified Health Risks:

## 2022-12-28 NOTE — ASSESSMENT & PLAN NOTE
He lost a little weight since last year with smaller portions. Encouraged further weight loss and regular exercise

## 2023-01-04 NOTE — ASSESSMENT & PLAN NOTE
Stable, A1C 6.1%  Encouraged him to continue regular physical activity and reduce simple carbohydrates

## 2023-05-14 ENCOUNTER — MEDICAL CORRESPONDENCE (OUTPATIENT)
Dept: HEALTH INFORMATION MANAGEMENT | Facility: CLINIC | Age: 73
End: 2023-05-14

## 2023-06-29 ENCOUNTER — OFFICE VISIT (OUTPATIENT)
Dept: INTERNAL MEDICINE | Facility: CLINIC | Age: 73
End: 2023-06-29
Payer: COMMERCIAL

## 2023-06-29 VITALS
SYSTOLIC BLOOD PRESSURE: 118 MMHG | OXYGEN SATURATION: 97 % | RESPIRATION RATE: 16 BRPM | WEIGHT: 201.8 LBS | TEMPERATURE: 98 F | HEART RATE: 94 BPM | BODY MASS INDEX: 34.45 KG/M2 | DIASTOLIC BLOOD PRESSURE: 60 MMHG | HEIGHT: 64 IN

## 2023-06-29 DIAGNOSIS — R73.03 PREDIABETES: ICD-10-CM

## 2023-06-29 DIAGNOSIS — E78.2 MIXED HYPERLIPIDEMIA: ICD-10-CM

## 2023-06-29 DIAGNOSIS — Z12.11 SCREEN FOR COLON CANCER: ICD-10-CM

## 2023-06-29 DIAGNOSIS — I10 ESSENTIAL HYPERTENSION: ICD-10-CM

## 2023-06-29 DIAGNOSIS — E66.01 MORBID OBESITY (H): Primary | ICD-10-CM

## 2023-06-29 DIAGNOSIS — Z23 NEED FOR SHINGLES VACCINE: ICD-10-CM

## 2023-06-29 DIAGNOSIS — F70 MILD INTELLECTUAL DISABILITIES: ICD-10-CM

## 2023-06-29 LAB
ALT SERPL W P-5'-P-CCNC: 23 U/L (ref 0–70)
ANION GAP SERPL CALCULATED.3IONS-SCNC: 12 MMOL/L (ref 7–15)
BUN SERPL-MCNC: 12.9 MG/DL (ref 8–23)
CALCIUM SERPL-MCNC: 9.4 MG/DL (ref 8.8–10.2)
CHLORIDE SERPL-SCNC: 99 MMOL/L (ref 98–107)
CHOLEST SERPL-MCNC: 122 MG/DL
CREAT SERPL-MCNC: 1.12 MG/DL (ref 0.67–1.17)
DEPRECATED HCO3 PLAS-SCNC: 26 MMOL/L (ref 22–29)
ERYTHROCYTE [DISTWIDTH] IN BLOOD BY AUTOMATED COUNT: 12.7 % (ref 10–15)
GFR SERPL CREATININE-BSD FRML MDRD: 69 ML/MIN/1.73M2
GLUCOSE SERPL-MCNC: 105 MG/DL (ref 70–99)
HBA1C MFR BLD: 5.7 % (ref 0–5.6)
HCT VFR BLD AUTO: 41 % (ref 40–53)
HDLC SERPL-MCNC: 38 MG/DL
HGB BLD-MCNC: 14.2 G/DL (ref 13.3–17.7)
LDLC SERPL CALC-MCNC: 47 MG/DL
MCH RBC QN AUTO: 34.8 PG (ref 26.5–33)
MCHC RBC AUTO-ENTMCNC: 34.6 G/DL (ref 31.5–36.5)
MCV RBC AUTO: 101 FL (ref 78–100)
NONHDLC SERPL-MCNC: 84 MG/DL
PLATELET # BLD AUTO: 356 10E3/UL (ref 150–450)
POTASSIUM SERPL-SCNC: 4.5 MMOL/L (ref 3.4–5.3)
RBC # BLD AUTO: 4.08 10E6/UL (ref 4.4–5.9)
SODIUM SERPL-SCNC: 137 MMOL/L (ref 136–145)
TRIGL SERPL-MCNC: 187 MG/DL
WBC # BLD AUTO: 11.1 10E3/UL (ref 4–11)

## 2023-06-29 PROCEDURE — 91313 COVID-19 BIVALENT 18+ (MODERNA): CPT | Performed by: NURSE PRACTITIONER

## 2023-06-29 PROCEDURE — 80061 LIPID PANEL: CPT | Performed by: NURSE PRACTITIONER

## 2023-06-29 PROCEDURE — 0134A COVID-19 BIVALENT 18+ (MODERNA): CPT | Performed by: NURSE PRACTITIONER

## 2023-06-29 PROCEDURE — 80048 BASIC METABOLIC PNL TOTAL CA: CPT | Performed by: NURSE PRACTITIONER

## 2023-06-29 PROCEDURE — 85027 COMPLETE CBC AUTOMATED: CPT | Performed by: NURSE PRACTITIONER

## 2023-06-29 PROCEDURE — 83036 HEMOGLOBIN GLYCOSYLATED A1C: CPT | Performed by: NURSE PRACTITIONER

## 2023-06-29 PROCEDURE — 84460 ALANINE AMINO (ALT) (SGPT): CPT | Performed by: NURSE PRACTITIONER

## 2023-06-29 PROCEDURE — 99214 OFFICE O/P EST MOD 30 MIN: CPT | Mod: 25 | Performed by: NURSE PRACTITIONER

## 2023-06-29 PROCEDURE — 36415 COLL VENOUS BLD VENIPUNCTURE: CPT | Performed by: NURSE PRACTITIONER

## 2023-06-29 RX ORDER — ATORVASTATIN CALCIUM 20 MG/1
20 TABLET, FILM COATED ORAL DAILY
Qty: 90 TABLET | Refills: 3 | Status: CANCELLED | OUTPATIENT
Start: 2023-06-29

## 2023-06-29 RX ORDER — LISINOPRIL 40 MG/1
40 TABLET ORAL DAILY
Qty: 90 TABLET | Refills: 1 | Status: SHIPPED | OUTPATIENT
Start: 2023-06-29 | End: 2023-12-27

## 2023-06-29 RX ORDER — METOPROLOL SUCCINATE 100 MG/1
100 TABLET, EXTENDED RELEASE ORAL DAILY
Qty: 90 TABLET | Refills: 1 | Status: SHIPPED | OUTPATIENT
Start: 2023-06-29 | End: 2023-12-27

## 2023-06-29 ASSESSMENT — PAIN SCALES - GENERAL: PAINLEVEL: NO PAIN (0)

## 2023-06-29 NOTE — PATIENT INSTRUCTIONS
- You are due for colon cancer screening. The lab will give you a kit to collect a stool sample and mail it back  - You had a COVID booster shot today  - We are doing labs to check your kidneys, blood sugar, and blood count  - Reminder to keep exercising and follow a low sugar diet so that you can lose a little more weight   - I also recommend that you get a shingles vaccine through your pharmacy

## 2023-06-29 NOTE — PROGRESS NOTES
"  Assessment & Plan   Problem List Items Addressed This Visit        Digestive    Obesity (BMI 35.0-39.9) with comorbidity (H) - Primary     Encouraged less snacking and continue exercise routine             Endocrine    HLD (hyperlipidemia), ASCVD risk 19.1% 6/2016     Continue statin          Relevant Orders    Lipid panel reflex to direct LDL Fasting (Completed)    CBC with platelets (Completed)    ALT (Completed)    Prediabetes     Continue regular exercise, encouraged weight loss          Relevant Orders    Hemoglobin A1c (Completed)       Circulatory    Hypertension     stable         Relevant Medications    lisinopril (ZESTRIL) 40 MG tablet    metoprolol succinate ER (TOPROL XL) 100 MG 24 hr tablet    Other Relevant Orders    Basic metabolic panel (Completed)       Behavioral    Mild intellectual disabilities     Family support from his mother, sister, niece         Other Visit Diagnoses     Need for shingles vaccine        Screen for colon cancer        Relevant Orders    Fecal colorectal cancer screen FIT - Future (S+30) (Completed)         - reminded that he is eligible for a shingles vaccine, note written for him to have his mother or sister check on the cost through his pharmacy            BMI:   Estimated body mass index is 34.37 kg/m  as calculated from the following:    Height as of this encounter: 1.632 m (5' 4.25\").    Weight as of this encounter: 91.5 kg (201 lb 12.8 oz).     Tamika Sauceda NP  [facility]    Aidan Oseguera is a 73 year old, presenting for the following health issues:  Follow Up (6 month follow up)        12/28/2022     9:13 AM   Additional Questions   Roomed by Bernadette   Accompanied by Mom and sister     History of Present Illness       Reason for visit:  Follow Up    He eats 2-3 servings of fruits and vegetables daily.He consumes 0 sweetened beverage(s) daily.He exercises with enough effort to increase his heart rate 20 to 29 minutes per day.  He exercises " "with enough effort to increase his heart rate 3 or less days per week.   He is taking medications regularly.     HTN- no dizziness or lightheadedness. He denies feeling chest pain, dyspnea. He is doing a daily exercise routine.             Objective    /60   Pulse 94   Temp 98  F (36.7  C) (Oral)   Resp 16   Ht 1.632 m (5' 4.25\")   Wt 91.5 kg (201 lb 12.8 oz)   SpO2 97%   BMI 34.37 kg/m    Body mass index is 34.37 kg/m .     Wt Readings from Last 5 Encounters:   06/29/23 91.5 kg (201 lb 12.8 oz)   12/28/22 91.6 kg (202 lb)   06/23/22 92.8 kg (204 lb 9.6 oz)   04/22/21 97.1 kg (214 lb)   10/20/20 97.5 kg (215 lb)       Physical Exam   GENERAL: healthy, alert and no distress  RESP: lungs clear to auscultation - no rales, rhonchi or wheezes  CV: regular rate and rhythm, normal S1 S2, no S3 or S4, no murmur. No edema   ABDOMEN: soft, nontender, no hepatosplenomegaly, no masses and bowel sounds normal  PSYCH: mentation appears normal, affect normal/bright              "

## 2023-07-01 PROCEDURE — 82274 ASSAY TEST FOR BLOOD FECAL: CPT | Performed by: NURSE PRACTITIONER

## 2023-07-03 DIAGNOSIS — E78.2 MIXED HYPERLIPIDEMIA: ICD-10-CM

## 2023-07-03 LAB — HEMOCCULT STL QL IA: POSITIVE

## 2023-07-03 RX ORDER — ATORVASTATIN CALCIUM 20 MG/1
TABLET, FILM COATED ORAL
Qty: 90 TABLET | Refills: 3 | OUTPATIENT
Start: 2023-07-03

## 2023-07-05 DIAGNOSIS — E78.2 MIXED HYPERLIPIDEMIA: ICD-10-CM

## 2023-07-05 DIAGNOSIS — Z12.11 SCREEN FOR COLON CANCER: Primary | ICD-10-CM

## 2023-07-05 RX ORDER — ATORVASTATIN CALCIUM 20 MG/1
20 TABLET, FILM COATED ORAL DAILY
Qty: 90 TABLET | Refills: 3 | Status: SHIPPED | OUTPATIENT
Start: 2023-07-05 | End: 2024-09-23

## 2023-11-28 ENCOUNTER — PATIENT OUTREACH (OUTPATIENT)
Dept: CARE COORDINATION | Facility: CLINIC | Age: 73
End: 2023-11-28
Payer: COMMERCIAL

## 2023-12-12 ENCOUNTER — PATIENT OUTREACH (OUTPATIENT)
Dept: CARE COORDINATION | Facility: CLINIC | Age: 73
End: 2023-12-12
Payer: COMMERCIAL

## 2023-12-26 DIAGNOSIS — I10 ESSENTIAL HYPERTENSION: ICD-10-CM

## 2023-12-27 RX ORDER — METOPROLOL SUCCINATE 100 MG/1
100 TABLET, EXTENDED RELEASE ORAL DAILY
Qty: 90 TABLET | Refills: 1 | Status: SHIPPED | OUTPATIENT
Start: 2023-12-27 | End: 2024-06-28

## 2023-12-27 RX ORDER — LISINOPRIL 40 MG/1
40 TABLET ORAL DAILY
Qty: 90 TABLET | Refills: 1 | Status: SHIPPED | OUTPATIENT
Start: 2023-12-27 | End: 2024-06-28

## 2024-03-02 ENCOUNTER — HEALTH MAINTENANCE LETTER (OUTPATIENT)
Age: 74
End: 2024-03-02

## 2024-05-02 ENCOUNTER — OFFICE VISIT (OUTPATIENT)
Dept: INTERNAL MEDICINE | Facility: CLINIC | Age: 74
End: 2024-05-02
Payer: COMMERCIAL

## 2024-05-02 VITALS
SYSTOLIC BLOOD PRESSURE: 122 MMHG | BODY MASS INDEX: 34.06 KG/M2 | WEIGHT: 200 LBS | TEMPERATURE: 98 F | HEART RATE: 74 BPM | OXYGEN SATURATION: 97 % | RESPIRATION RATE: 16 BRPM | DIASTOLIC BLOOD PRESSURE: 70 MMHG

## 2024-05-02 DIAGNOSIS — Z00.00 ENCOUNTER FOR MEDICARE ANNUAL WELLNESS EXAM: Primary | ICD-10-CM

## 2024-05-02 DIAGNOSIS — D75.839 THROMBOCYTOSIS: ICD-10-CM

## 2024-05-02 DIAGNOSIS — Z12.11 SCREEN FOR COLON CANCER: ICD-10-CM

## 2024-05-02 DIAGNOSIS — R19.5 POSITIVE FIT (FECAL IMMUNOCHEMICAL TEST): ICD-10-CM

## 2024-05-02 DIAGNOSIS — I10 ESSENTIAL HYPERTENSION: ICD-10-CM

## 2024-05-02 DIAGNOSIS — F70 MILD INTELLECTUAL DISABILITIES: ICD-10-CM

## 2024-05-02 DIAGNOSIS — E78.49 OTHER HYPERLIPIDEMIA: ICD-10-CM

## 2024-05-02 DIAGNOSIS — R73.03 PREDIABETES: ICD-10-CM

## 2024-05-02 DIAGNOSIS — E66.01 MORBID OBESITY (H): ICD-10-CM

## 2024-05-02 LAB
ERYTHROCYTE [DISTWIDTH] IN BLOOD BY AUTOMATED COUNT: 12.5 % (ref 10–15)
HBA1C MFR BLD: 6.1 % (ref 0–5.6)
HCT VFR BLD AUTO: 43.1 % (ref 40–53)
HGB BLD-MCNC: 15 G/DL (ref 13.3–17.7)
MCH RBC QN AUTO: 34.5 PG (ref 26.5–33)
MCHC RBC AUTO-ENTMCNC: 34.8 G/DL (ref 31.5–36.5)
MCV RBC AUTO: 99 FL (ref 78–100)
PLATELET # BLD AUTO: 373 10E3/UL (ref 150–450)
RBC # BLD AUTO: 4.35 10E6/UL (ref 4.4–5.9)
WBC # BLD AUTO: 10.1 10E3/UL (ref 4–11)

## 2024-05-02 PROCEDURE — 85027 COMPLETE CBC AUTOMATED: CPT | Performed by: NURSE PRACTITIONER

## 2024-05-02 PROCEDURE — 80061 LIPID PANEL: CPT | Performed by: NURSE PRACTITIONER

## 2024-05-02 PROCEDURE — 99214 OFFICE O/P EST MOD 30 MIN: CPT | Mod: 25 | Performed by: NURSE PRACTITIONER

## 2024-05-02 PROCEDURE — G0439 PPPS, SUBSEQ VISIT: HCPCS | Performed by: NURSE PRACTITIONER

## 2024-05-02 PROCEDURE — 90480 ADMN SARSCOV2 VAC 1/ONLY CMP: CPT | Performed by: NURSE PRACTITIONER

## 2024-05-02 PROCEDURE — 84460 ALANINE AMINO (ALT) (SGPT): CPT | Performed by: NURSE PRACTITIONER

## 2024-05-02 PROCEDURE — 83036 HEMOGLOBIN GLYCOSYLATED A1C: CPT | Performed by: NURSE PRACTITIONER

## 2024-05-02 PROCEDURE — 80048 BASIC METABOLIC PNL TOTAL CA: CPT | Performed by: NURSE PRACTITIONER

## 2024-05-02 PROCEDURE — 36415 COLL VENOUS BLD VENIPUNCTURE: CPT | Performed by: NURSE PRACTITIONER

## 2024-05-02 PROCEDURE — 91320 SARSCV2 VAC 30MCG TRS-SUC IM: CPT | Performed by: NURSE PRACTITIONER

## 2024-05-02 RX ORDER — RESPIRATORY SYNCYTIAL VIRUS VACCINE 120MCG/0.5
0.5 KIT INTRAMUSCULAR ONCE
Qty: 1 EACH | Refills: 0 | Status: CANCELLED | OUTPATIENT
Start: 2024-05-02 | End: 2024-05-02

## 2024-05-02 SDOH — HEALTH STABILITY: PHYSICAL HEALTH: ON AVERAGE, HOW MANY DAYS PER WEEK DO YOU ENGAGE IN MODERATE TO STRENUOUS EXERCISE (LIKE A BRISK WALK)?: 7 DAYS

## 2024-05-02 ASSESSMENT — SOCIAL DETERMINANTS OF HEALTH (SDOH): HOW OFTEN DO YOU GET TOGETHER WITH FRIENDS OR RELATIVES?: TWICE A WEEK

## 2024-05-02 NOTE — PATIENT INSTRUCTIONS
Preventive Care Advice   This is general advice given by our system to help you stay healthy. However, your care team may have specific advice just for you. Please talk to your care team about your preventive care needs.  Nutrition  Eat 5 or more servings of fruits and vegetables each day.  Try wheat bread, brown rice and whole grain pasta (instead of white bread, rice, and pasta).  Get enough calcium and vitamin D. Check the label on foods and aim for 100% of the RDA (recommended daily allowance).  Lifestyle  Exercise at least 150 minutes each week   (30 minutes a day, 5 days a week).  Do muscle strengthening activities 2 days a week. These help control your weight and prevent disease.  No smoking.  Wear sunscreen to prevent skin cancer.  Have a dental exam and cleaning every 6 months.  Yearly exams  See your health care team every year to talk about:  Any changes in your health.  Any medicines your care team has prescribed.  Preventive care, family planning, and ways to prevent chronic diseases.  Shots (vaccines)   HPV shots (up to age 26), if you've never had them before.  Hepatitis B shots (up to age 59), if you've never had them before.  COVID-19 shot: Get this shot when it's due.  Flu shot: Get a flu shot every year.  Tetanus shot: Get a tetanus shot every 10 years.  Pneumococcal, hepatitis A, and RSV shots: Ask your care team if you need these based on your risk.  Shingles shot (for age 50 and up).  General health tests  Diabetes screening:  Starting at age 35, Get screened for diabetes at least every 3 years.  If you are younger than age 35, ask your care team if you should be screened for diabetes.  Cholesterol test: At age 39, start having a cholesterol test every 5 years, or more often if advised.  Bone density scan (DEXA): At age 50, ask your care team if you should have this scan for osteoporosis (brittle bones).  Hepatitis C: Get tested at least once in your life.  STIs (sexually transmitted  infections)  Before age 24: Ask your care team if you should be screened for STIs.  After age 24: Get screened for STIs if you're at risk. You are at risk for STIs (including HIV) if:  You are sexually active with more than one person.  You don't use condoms every time.  You or a partner was diagnosed with a sexually transmitted infection.  If you are at risk for HIV, ask about PrEP medicine to prevent HIV.  Get tested for HIV at least once in your life, whether you are at risk for HIV or not.  Cancer screening tests  Cervical cancer screening: If you have a cervix, begin getting regular cervical cancer screening tests at age 21. Most people who have regular screenings with normal results can stop after age 65. Talk about this with your provider.  Breast cancer scan (mammogram): If you've ever had breasts, begin having regular mammograms starting at age 40. This is a scan to check for breast cancer.  Colon cancer screening: It is important to start screening for colon cancer at age 45.  Have a colonoscopy test every 10 years (or more often if you're at risk) Or, ask your provider about stool tests like a FIT test every year or Cologuard test every 3 years.  To learn more about your testing options, visit: https://www.[employer]/999175.pdf.  For help making a decision, visit: https://bit.ly/gg39154.  Prostate cancer screening test: If you have a prostate and are age 55 to 69, ask your provider if you would benefit from a yearly prostate cancer screening test.  Lung cancer screening: If you are a current or former smoker age 50 to 80, ask your care team if ongoing lung cancer screenings are right for you.  For informational purposes only. Not to replace the advice of your health care provider. Copyright   2023 [employer]. All rights reserved. Clinically reviewed by the [facility] Transitions Program. [employer] 931141 - REV 01/24.    Learning About Activities of Daily Living  What are activities  of daily living?     Activities of daily living (ADLs) are the basic self-care tasks you do every day. These include eating, bathing, dressing, and moving around.  As you age, and if you have health problems, you may find that it's harder to do some of these tasks. If so, your doctor can suggest ideas that may help.  To measure what kind of help you may need, your doctor will ask how well you are able to do ADLs. Let your doctor know if there are any tasks that you are having trouble doing. This is an important first step to getting help. And when you have the help you need, you can stay as independent as possible.  How will a doctor assess your ADLs?  Asking about ADLs is part of a routine health checkup your doctor will likely do as you age. Your health check might be done in a doctor's office, in your home, or at a hospital. The goal is to find out if you are having any problems that could make it hard to care for yourself or that make it unsafe for you to be on your own.  To measure your ADLs, your doctor will ask how hard it is for you to do routine tasks. Your doctor may also want to know if you have changed the way you do a task because of a health problem. Your doctor may watch how you:  Walk back and forth.  Keep your balance while you stand or walk.  Move from sitting to standing or from a bed to a chair.  Button or unbutton a shirt or sweater.  Remove and put on your shoes.  It's common to feel a little worried or anxious if you find you can't do all the things you used to be able to do. Talking with your doctor about ADLs is a way to make sure you're as safe as possible and able to care for yourself as well as you can. You may want to bring a caregiver, friend, or family member to your checkup. They can help you talk to your doctor.  Follow-up care is a key part of your treatment and safety. Be sure to make and go to all appointments, and call your doctor if you are having problems. It's also a good idea  to know your test results and keep a list of the medicines you take.  Current as of: October 24, 2023               Content Version: 14.0    5165-4532 authorGEN.   Care instructions adapted under license by your healthcare professional. If you have questions about a medical condition or this instruction, always ask your healthcare professional. [employer] disclaims any warranty or liability for your use of this information.

## 2024-05-02 NOTE — PROGRESS NOTES
"Preventive Care Visit  [facility]  Tamika Sauceda NP  May 2, 2024      Assessment & Plan   Problem List Items Addressed This Visit       Hypertension     Normotensive with current medications          Relevant Orders    Basic metabolic panel (Completed)    HLD (hyperlipidemia), ASCVD risk 19.1% 6/2016     Continue statin          Relevant Orders    Lipid panel reflex to direct LDL Fasting (Completed)    ALT (Completed)    Mild intellectual disabilities     Well supported by his sister, Lucas, and other family members          Prediabetes     A1C has increased to 6.1% but remains in a prediabetes range. Continue to work toward weight loss, low carbohydrate/sugar diet, and regular exercise          Relevant Orders    Hemoglobin A1c (Completed)    Obesity (BMI 35.0-39.9) with comorbidity (H)     Encouraged less snacking and continue exercise routine          Thrombocytosis    Relevant Orders    CBC with platelets (Completed)    Positive FIT (fecal immunochemical test)     Unfortunately, he had a positive FIT test and is also reporting stool changes within the past 1 year. Due to intellectual disability, the prep may be difficult for him to manage. We discussed a hospitalized prep, he is opposed to this and would prefer to try the usual prep first at home.   - Referral to [facility] for colonoscopy          Relevant Orders    Adult GI  Referral - Procedure Only     Other Visit Diagnoses       Encounter for Medicare annual wellness exam    -  Primary    Screen for colon cancer        Relevant Orders    Adult GI  Referral - Procedure Only           - Recommended a shingles, tdap vaccine through the pharmacy   - COVID vaccine given today          BMI  Estimated body mass index is 34.06 kg/m  as calculated from the following:    Height as of 6/29/23: 1.632 m (5' 4.25\").    Weight as of this encounter: 90.7 kg (200 lb).     Counseling  Appropriate preventive services were discussed with this " patient, including applicable screening as appropriate for fall prevention, nutrition, physical activity, Tobacco-use cessation, weight loss and cognition.  Checklist reviewing preventive services available has been given to the patient.  Reviewed patient's diet, addressing concerns and/or questions.   Updated plan of care.  Patient reported difficulty with activities of daily living were addressed today.      Aidan Oseguera is a 74 year old, presenting for the following:  Physical (AWV), Recheck Medication (Pt sister states I think Lisinopril cause a chronic caught for him ), and Sick (He was sick last week, lay in bed all day. Not sure if is flu )        5/2/2024     7:17 AM   Additional Questions   Roomed by Lesli Reilly   Accompanied by Sister       Health Care Directive  Patient does not have a Health Care Directive or Living Will: Discussed advance care planning with patient; information given to patient to review.    HPI  HTN- he does not report any issues with the medication but his sister has noticed that he has a consistent dry cough that has been consistent in the past 1 year+ since she moved in with her brother.     He had a positive FIT test in July. His sister also notes that he has been having bowel movement accidents. He reports increased stool frequency and occasional loose stools. No black/bloody stools.         5/2/2024   General Health   How would you rate your overall physical health? Excellent   Feel stress (tense, anxious, or unable to sleep) Not at all         5/2/2024   Nutrition   Diet: Regular (no restrictions)         5/2/2024   Exercise   Days per week of moderate/strenous exercise 7 days         5/2/2024   Social Factors   Frequency of gathering with friends or relatives Twice a week   Worry food won't last until get money to buy more No   Food not last or not have enough money for food? No   Do you have housing?  Yes   Are you worried about losing your housing? No   Lack of  transportation? No   Unable to get utilities (heat,electricity)? No         5/2/2024   Fall Risk   Fallen 2 or more times in the past year? No   Trouble with walking or balance? No          5/2/2024   Activities of Daily Living- Home Safety   Needs help with the following daily activites Telephone use    Transportation    Shopping    Housework    Laundry    Money management   Safety concerns in the home None of the above         5/2/2024   Dental   Dentist two times every year? Yes         5/2/2024   Hearing Screening   Hearing concerns? None of the above         5/2/2024   Driving Risk Screening   Patient/family members have concerns about driving (!) DECLINE- pt does not drive          5/2/2024   General Alertness/Fatigue Screening   Have you been more tired than usual lately? No         5/2/2024   Urinary Incontinence Screening   Bothered by leaking urine in past 6 months No         5/2/2024   TB Screening   Were you born outside of the [place]? No         Today's PHQ-2 Score:       5/2/2024     7:16 AM   PHQ-2 ( 1999 [employer])   Q1: Little interest or pleasure in doing things 0   Q2: Feeling down, depressed or hopeless 0   PHQ-2 Score 0   Q1: Little interest or pleasure in doing things Not at all   Q2: Feeling down, depressed or hopeless Not at all   PHQ-2 Score 0           5/2/2024   Substance Use   Alcohol more than 3/day or more than 7/wk No   Do you have a current opioid prescription? No   How severe/bad is pain from 1 to 10? 0/10 (No Pain)   Do you use any other substances recreationally? No     Social History     Tobacco Use     Smoking status: Never     Smokeless tobacco: Never   Vaping Use     Vaping status: Never Used   Substance Use Topics     Alcohol use: No     Drug use: No           5/2/2024   AAA Screening   Family history of Abdominal Aortic Aneurysm (AAA)? No   ASCVD Risk   The ASCVD Risk score (Artemio DK, et al., 2019) failed to calculate for the following reasons:    The valid total  "cholesterol range is 130 to 320 mg/dL      Reviewed and updated as needed this visit by Provider   Tobacco  Allergies  Meds  Problems  Med Hx  Surg Hx  Fam Hx              Current providers sharing in care for this patient include:  Patient Care Team:  Tamika Sauceda NP as PCP - General  Tamika Sauceda NP as Assigned PCP    The following health maintenance items are reviewed in Epic and correct as of today:  Health Maintenance   Topic Date Due     ANNUAL REVIEW OF HM ORDERS  Never done     ZOSTER IMMUNIZATION (1 of 2) Never done     RSV VACCINE (Pregnancy & 60+) (1 - 1-dose 60+ series) Never done     DTAP/TDAP/TD IMMUNIZATION (2 - Td or Tdap) 09/26/2022     COLORECTAL CANCER SCREENING  07/02/2023     INFLUENZA VACCINE (Season Ended) 09/01/2024     COVID-19 Vaccine (7 - 2023-24 season) 09/02/2024     MEDICARE ANNUAL WELLNESS VISIT  05/02/2025     LIPID  05/02/2025     FALL RISK ASSESSMENT  05/02/2025     GLUCOSE  05/02/2027     ADVANCE CARE PLANNING  01/04/2028     PHQ-2 (once per calendar year)  Completed     Pneumococcal Vaccine: 65+ Years  Completed     IPV IMMUNIZATION  Aged Out     HPV IMMUNIZATION  Aged Out     MENINGITIS IMMUNIZATION  Aged Out     RSV MONOCLONAL ANTIBODY  Aged Out     HEPATITIS C SCREENING  Discontinued        Objective    Exam  /70 (BP Location: Right arm, Patient Position: Sitting)   Pulse 74   Temp 98  F (36.7  C) (Oral)   Resp 16   Wt 90.7 kg (200 lb)   SpO2 97%   BMI 34.06 kg/m     Estimated body mass index is 34.06 kg/m  as calculated from the following:    Height as of 6/29/23: 1.632 m (5' 4.25\").    Weight as of this encounter: 90.7 kg (200 lb).    Wt Readings from Last 5 Encounters:   05/02/24 90.7 kg (200 lb)   06/29/23 91.5 kg (201 lb 12.8 oz)   12/28/22 91.6 kg (202 lb)   06/23/22 92.8 kg (204 lb 9.6 oz)   04/22/21 97.1 kg (214 lb)       Physical Exam  GENERAL: alert and no distress  EYES: Eyes grossly normal to inspection, conjunctivae and sclerae normal  HENT: ear " canals and TM's normal, mouth without ulcers or lesions  NECK: no adenopathy, no asymmetry, masses, or scars  RESP: lungs clear to auscultation - no rales, rhonchi or wheezes  CV: regular rate and rhythm, normal S1 S2, no S3 or S4, no murmur, click or rub, no peripheral edema  NEURO: Normal strength and tone, mentation intact and speech normal  PSYCH: mentation appears normal, affect normal/bright        5/2/2024   Mini Cog   Clock Draw Score 0 Abnormal   3 Item Recall 2 objects recalled   Mini Cog Total Score 2            Signed Electronically by: Tamika Sauceda, NP

## 2024-05-03 LAB
ALT SERPL W P-5'-P-CCNC: 37 U/L (ref 0–70)
ANION GAP SERPL CALCULATED.3IONS-SCNC: 14 MMOL/L (ref 7–15)
BUN SERPL-MCNC: 20 MG/DL (ref 8–23)
CALCIUM SERPL-MCNC: 9.5 MG/DL (ref 8.8–10.2)
CHLORIDE SERPL-SCNC: 100 MMOL/L (ref 98–107)
CHOLEST SERPL-MCNC: 115 MG/DL
CREAT SERPL-MCNC: 1.1 MG/DL (ref 0.67–1.17)
DEPRECATED HCO3 PLAS-SCNC: 24 MMOL/L (ref 22–29)
EGFRCR SERPLBLD CKD-EPI 2021: 70 ML/MIN/1.73M2
FASTING STATUS PATIENT QL REPORTED: YES
GLUCOSE SERPL-MCNC: 126 MG/DL (ref 70–99)
HDLC SERPL-MCNC: 37 MG/DL
LDLC SERPL CALC-MCNC: 43 MG/DL
NONHDLC SERPL-MCNC: 78 MG/DL
POTASSIUM SERPL-SCNC: 4.7 MMOL/L (ref 3.4–5.3)
SODIUM SERPL-SCNC: 138 MMOL/L (ref 135–145)
TRIGL SERPL-MCNC: 175 MG/DL

## 2024-05-07 PROBLEM — R19.5 POSITIVE FIT (FECAL IMMUNOCHEMICAL TEST): Status: ACTIVE | Noted: 2024-05-07

## 2024-05-07 NOTE — ASSESSMENT & PLAN NOTE
Unfortunately, he had a positive FIT test and is also reporting stool changes within the past 1 year. Due to intellectual disability, the prep may be difficult for him to manage. We discussed a hospitalized prep, he is opposed to this and would prefer to try the usual prep first at home.   - Referral to [facility] for colonoscopy

## 2024-05-07 NOTE — ASSESSMENT & PLAN NOTE
A1C has increased to 6.1% but remains in a prediabetes range. Continue to work toward weight loss, low carbohydrate/sugar diet, and regular exercise

## 2024-06-28 DIAGNOSIS — I10 ESSENTIAL HYPERTENSION: ICD-10-CM

## 2024-06-28 RX ORDER — METOPROLOL SUCCINATE 100 MG/1
100 TABLET, EXTENDED RELEASE ORAL DAILY
Qty: 90 TABLET | Refills: 2 | Status: SHIPPED | OUTPATIENT
Start: 2024-06-28

## 2024-06-28 RX ORDER — LISINOPRIL 40 MG/1
40 TABLET ORAL DAILY
Qty: 90 TABLET | Refills: 2 | Status: SHIPPED | OUTPATIENT
Start: 2024-06-28

## 2024-09-23 DIAGNOSIS — E78.2 MIXED HYPERLIPIDEMIA: ICD-10-CM

## 2024-09-23 RX ORDER — ATORVASTATIN CALCIUM 20 MG/1
20 TABLET, FILM COATED ORAL DAILY
Qty: 90 TABLET | Refills: 1 | Status: SHIPPED | OUTPATIENT
Start: 2024-09-23

## 2025-03-19 DIAGNOSIS — E78.2 MIXED HYPERLIPIDEMIA: ICD-10-CM

## 2025-03-19 DIAGNOSIS — I10 ESSENTIAL HYPERTENSION: ICD-10-CM

## 2025-03-19 RX ORDER — LISINOPRIL 40 MG/1
40 TABLET ORAL DAILY
Qty: 90 TABLET | Refills: 0 | Status: SHIPPED | OUTPATIENT
Start: 2025-03-19

## 2025-03-19 RX ORDER — ATORVASTATIN CALCIUM 20 MG/1
20 TABLET, FILM COATED ORAL DAILY
Qty: 90 TABLET | Refills: 0 | Status: SHIPPED | OUTPATIENT
Start: 2025-03-19

## 2025-03-19 RX ORDER — METOPROLOL SUCCINATE 100 MG/1
100 TABLET, EXTENDED RELEASE ORAL DAILY
Qty: 90 TABLET | Refills: 0 | Status: SHIPPED | OUTPATIENT
Start: 2025-03-19

## 2025-05-13 ENCOUNTER — OFFICE VISIT (OUTPATIENT)
Dept: INTERNAL MEDICINE | Facility: CLINIC | Age: 75
End: 2025-05-13
Payer: COMMERCIAL

## 2025-05-13 VITALS
BODY MASS INDEX: 35.68 KG/M2 | RESPIRATION RATE: 16 BRPM | SYSTOLIC BLOOD PRESSURE: 128 MMHG | HEART RATE: 89 BPM | HEIGHT: 64 IN | WEIGHT: 209 LBS | OXYGEN SATURATION: 96 % | DIASTOLIC BLOOD PRESSURE: 66 MMHG | TEMPERATURE: 98.6 F

## 2025-05-13 DIAGNOSIS — E78.2 MIXED HYPERLIPIDEMIA: ICD-10-CM

## 2025-05-13 DIAGNOSIS — F70 MILD INTELLECTUAL DISABILITIES: ICD-10-CM

## 2025-05-13 DIAGNOSIS — Z00.00 ENCOUNTER FOR MEDICARE ANNUAL WELLNESS EXAM: Primary | ICD-10-CM

## 2025-05-13 DIAGNOSIS — B35.1 ONYCHOMYCOSIS: ICD-10-CM

## 2025-05-13 DIAGNOSIS — R73.03 PREDIABETES: ICD-10-CM

## 2025-05-13 DIAGNOSIS — D75.89 MACROCYTOSIS: ICD-10-CM

## 2025-05-13 DIAGNOSIS — I10 ESSENTIAL HYPERTENSION: ICD-10-CM

## 2025-05-13 DIAGNOSIS — E66.01 MORBID OBESITY (H): ICD-10-CM

## 2025-05-13 LAB
ERYTHROCYTE [DISTWIDTH] IN BLOOD BY AUTOMATED COUNT: 12.6 % (ref 10–15)
EST. AVERAGE GLUCOSE BLD GHB EST-MCNC: 128 MG/DL
HBA1C MFR BLD: 6.1 % (ref 0–5.6)
HCT VFR BLD AUTO: 42.3 % (ref 40–53)
HGB BLD-MCNC: 14.4 G/DL (ref 13.3–17.7)
MCH RBC QN AUTO: 34.5 PG (ref 26.5–33)
MCHC RBC AUTO-ENTMCNC: 34 G/DL (ref 31.5–36.5)
MCV RBC AUTO: 101 FL (ref 78–100)
PLATELET # BLD AUTO: 377 10E3/UL (ref 150–450)
RBC # BLD AUTO: 4.17 10E6/UL (ref 4.4–5.9)
WBC # BLD AUTO: 13.1 10E3/UL (ref 4–11)

## 2025-05-13 PROCEDURE — 36415 COLL VENOUS BLD VENIPUNCTURE: CPT | Performed by: NURSE PRACTITIONER

## 2025-05-13 PROCEDURE — 85027 COMPLETE CBC AUTOMATED: CPT | Performed by: NURSE PRACTITIONER

## 2025-05-13 PROCEDURE — 83036 HEMOGLOBIN GLYCOSYLATED A1C: CPT | Performed by: NURSE PRACTITIONER

## 2025-05-13 PROCEDURE — 80061 LIPID PANEL: CPT | Performed by: NURSE PRACTITIONER

## 2025-05-13 PROCEDURE — 80048 BASIC METABOLIC PNL TOTAL CA: CPT | Performed by: NURSE PRACTITIONER

## 2025-05-13 PROCEDURE — 82607 VITAMIN B-12: CPT | Performed by: NURSE PRACTITIONER

## 2025-05-13 RX ORDER — ATORVASTATIN CALCIUM 20 MG/1
20 TABLET, FILM COATED ORAL DAILY
Qty: 90 TABLET | Refills: 3 | Status: SHIPPED | OUTPATIENT
Start: 2025-05-13

## 2025-05-13 RX ORDER — METOPROLOL SUCCINATE 100 MG/1
100 TABLET, EXTENDED RELEASE ORAL DAILY
Qty: 90 TABLET | Refills: 3 | Status: SHIPPED | OUTPATIENT
Start: 2025-05-13

## 2025-05-13 RX ORDER — LISINOPRIL 40 MG/1
40 TABLET ORAL DAILY
Qty: 90 TABLET | Refills: 3 | Status: SHIPPED | OUTPATIENT
Start: 2025-05-13

## 2025-05-13 SDOH — HEALTH STABILITY: PHYSICAL HEALTH: ON AVERAGE, HOW MANY DAYS PER WEEK DO YOU ENGAGE IN MODERATE TO STRENUOUS EXERCISE (LIKE A BRISK WALK)?: 0 DAYS

## 2025-05-13 SDOH — HEALTH STABILITY: PHYSICAL HEALTH: ON AVERAGE, HOW MANY MINUTES DO YOU ENGAGE IN EXERCISE AT THIS LEVEL?: 0 MIN

## 2025-05-13 ASSESSMENT — PAIN SCALES - GENERAL: PAINLEVEL_OUTOF10: NO PAIN (0)

## 2025-05-13 ASSESSMENT — SOCIAL DETERMINANTS OF HEALTH (SDOH): HOW OFTEN DO YOU GET TOGETHER WITH FRIENDS OR RELATIVES?: MORE THAN THREE TIMES A WEEK

## 2025-05-13 NOTE — PATIENT INSTRUCTIONS
Patient Education   Preventive Care Advice   - add a little more vegetables to your meals  - I recommend that you take a bath at least twice per week   - We talked that you could take a medicine to help you not feel so irritated by your sister  - Your nails need to be trimmed by a foot specialist because you have toenail fungus that makes your nails really thick. Below is a list of places that provide toenail care.     Podiatry Clinics that offer foot and toenail care   [facility]   [facility]   [phone]     [facility] Clinics, [facility]   [phone]     [facility]   [place] - Dr. Peraza on Tuesdays   [phone]     [place] [facility]   [place]   [phone]     [place] [facility]   [facility]   [phone]     [facility]   [phone]     [facility]   [phone]     [facility]   They have several locations and have a team of registered nurses that offer diabetic foot care.   They do not bill to insurance and the average cost per visit is $37.   [facility]   [phone]     [facility]   *Nurse comes to your home for nail care.   Caridad Tolentino RN Foot Specialist   [phone]  This is general advice given by our system to help you stay healthy. However, your care team may have specific advice just for you. Please talk to your care team about your preventive care needs.  Nutrition  Eat 5 or more servings of fruits and vegetables each day.  Try wheat bread, brown rice and whole grain pasta (instead of white bread, rice, and pasta).  Get enough calcium and vitamin D. Check the label on foods and aim for 100% of the RDA (recommended daily allowance).  Lifestyle  Exercise at least 150 minutes each week  (30 minutes a day, 5 days a week).  Do muscle strengthening activities 2 days  a week. These help control your weight and prevent disease.  No smoking.  Wear sunscreen to prevent skin cancer.  Have a dental exam and cleaning every 6 months.  Yearly exams  See your health care team every year to talk about:  Any changes in your health.  Any medicines your care team has prescribed.  Preventive care, family planning, and ways to prevent chronic diseases.  Shots (vaccines)   HPV shots (up to age 26), if you've never had them before.  Hepatitis B shots (up to age 59), if you've never had them before.  COVID-19 shot: Get this shot when it's due.  Flu shot: Get a flu shot every year.  Tetanus shot: Get a tetanus shot every 10 years.  Pneumococcal, hepatitis A, and RSV shots: Ask your care team if you need these based on your risk.  Shingles shot (for age 50 and up)  General health tests  Diabetes screening:  Starting at age 35, Get screened for diabetes at least every 3 years.  If you are younger than age 35, ask your care team if you should be screened for diabetes.  Cholesterol test: At age 39, start having a cholesterol test every 5 years, or more often if advised.  Bone density scan (DEXA): At age 50, ask your care team if you should have this scan for osteoporosis (brittle bones).  Hepatitis C: Get tested at least once in your life.  STIs (sexually transmitted infections)  Before age 24: Ask your care team if you should be screened for STIs.  After age 24: Get screened for STIs if you're at risk. You are at risk for STIs (including HIV) if:  You are sexually active with more than one person.  You don't use condoms every time.  You or a partner was diagnosed with a sexually transmitted infection.  If you are at risk for HIV, ask about PrEP medicine to prevent HIV.  Get tested for HIV at least once in your life, whether you are at risk for HIV or not.  Cancer screening tests  Cervical cancer screening: If you have a cervix, begin getting regular cervical cancer screening tests starting at age  21.  Breast cancer scan (mammogram): If you've ever had breasts, begin having regular mammograms starting at age 40. This is a scan to check for breast cancer.  Colon cancer screening: It is important to start screening for colon cancer at age 45.  Have a colonoscopy test every 10 years (or more often if you're at risk) Or, ask your provider about stool tests like a FIT test every year or Cologuard test every 3 years.  To learn more about your testing options, visit:   .  For help making a decision, visit:   https://bit.ly/rf05143.  Prostate cancer screening test: If you have a prostate, ask your care team if a prostate cancer screening test (PSA) at age 55 is right for you.  Lung cancer screening: If you are a current or former smoker ages 50 to 80, ask your care team if ongoing lung cancer screenings are right for you.  For informational purposes only. Not to replace the advice of your health care provider. Copyright   2023 [facility]. All rights reserved. Clinically reviewed by the [facility] Transitions Program. [employer] 192228 - REV 01/24.

## 2025-05-13 NOTE — PROGRESS NOTES
"  Internal Medicine Annual Wellness Visit  [address] [address]  Phone: [phone]  Fax: [phone]          Patient Name: Oumar Dallas  Patient Age: 75 year old  YOB: 1950  MRN: [id number]    Date of Visit: 5/13/2025  Primary Provider: Tamika Sauceda    Patient Care Team:  Tamika Sauceda NP as PCP - General  Tamika Sauceda NP as Assigned PCP     Subjective   Patient presents with:  Wellness Visit  Well Child         5/13/2025     1:47 PM   Additional Questions   Roomed by Elio ISRAEL MA     HPI:  ***      Patient Active Problem List   Diagnosis    Dermatitis    Hypertension    HLD (hyperlipidemia), ASCVD risk 19.1% 6/2016    Mild intellectual disabilities    Prediabetes    Obesity (BMI 35.0-39.9) with comorbidity (H)    Monocytosis    Thrombocytosis    Positive FIT (fecal immunochemical test)    Onychomycosis     Current Scheduled Meds:  Current Outpatient Medications   Medication Sig Dispense Refill    atorvastatin (LIPITOR) 20 MG tablet Take 1 tablet (20 mg) by mouth daily. 90 tablet 3    lisinopril (ZESTRIL) 40 MG tablet Take 1 tablet (40 mg) by mouth daily. 90 tablet 3    metoprolol succinate ER (TOPROL XL) 100 MG 24 hr tablet Take 1 tablet (100 mg) by mouth daily. 90 tablet 3         Objective   Physical Examination:  Vitals:    05/13/25 1350   BP: 128/66   Pulse: 89   Resp: 16   Temp: 98.6  F (37  C)   TempSrc: Oral   SpO2: 96%   Weight: 94.8 kg (209 lb)   Height: 1.613 m (5' 3.5\")     Wt Readings from Last 5 Encounters:   05/13/25 94.8 kg (209 lb)   05/02/24 90.7 kg (200 lb)   06/29/23 91.5 kg (201 lb 12.8 oz)   12/28/22 91.6 kg (202 lb)   06/23/22 92.8 kg (204 lb 9.6 oz)     Body mass index is 36.44 kg/m .   ***  Constitutional: In no apparent distress  Eyes: Conjunctivae clear. Non-icteric.   ENT: Tympanic membrane clear with landmarks well visualized bilaterally. Septum midline, nares patent, no visible polyps, mucosa moist " and without drainage. Lips and mucosa moist. Pharynx without erythema or exudate. Neck is supple, trachea midline. No cervical adenopathy  Respiratory: Clear to auscultation bilaterally. Normal inspiratory and expiratory effort  Cardiovascular: Regular rate and rhythm. No murmurs, rubs, or gallops. No edema. No carotid bruits.   Gastrointestinal: Bowel sounds active all four quadrants. Soft, non-tender.   Skin: Warm and dry. Without suspicious looking lesions  Neuro: Normal gait  Psych: Alert and oriented x3.     Diagnoses managed today:  1. Essential hypertension    2. Screening for cardiovascular condition    3. Mixed hyperlipidemia    4. Encounter for Medicare annual wellness exam    5. Prediabetes    6. Onychomycosis    7. Mild intellectual disabilities    8. Obesity (BMI 35.0-39.9) with comorbidity (H)         Assessment & Plan   ***      I am having Oumar Dallas maintain his atorvastatin, lisinopril, and metoprolol succinate ER.        Orders Placed This Encounter   Procedures    COVID-19 12+ (PFIZER)    BASIC METABOLIC PANEL    Lipid panel reflex to direct LDL Non-fasting    Hemoglobin A1c    CBC with platelets       Follow up: Return in about 1 year (around 5/13/2026) for AWV. Sooner if needed.    Additional required elements:  I have reviewed opioid use disorder and substance use disorder risk factors and made any needed referrals.  This may not apply to this individual patient, but this documentation is required by Medicare.    Counseling   Appropriate preventive services were addressed with this patient via screening, questionnaire, or discussion as appropriate for fall prevention, nutrition, physical activity, Tobacco-use cessation, social engagement, weight loss and cognition.  Checklist reviewing preventive services available has been given to the patient.    Memory screen:      5/2/2024     7:22 AM 5/13/2025     1:52 PM   MINI COG   Clock Draw Score 0 Abnormal 0 Abnormal   3 Item Recall 2 objects  recalled 2 objects recalled   Mini Cog Total Score 2 2        PHQ-2 Score:       5/13/2025     1:49 PM 5/2/2024     7:16 AM   PHQ-2 ( 1999 Pfizer)   Q1: Little interest or pleasure in doing things 0 0   Q2: Feeling down, depressed or hopeless 0 0   PHQ-2 Score 0 0   Q1: Little interest or pleasure in doing things  Not at all   Q2: Feeling down, depressed or hopeless  Not at all   PHQ-2 Score Incomplete 0      Advanced care planning reviewed.        5/13/2025   Fall Risk   Fallen 2 or more times in the past year? No   Trouble with walking or balance? No         Health Maintenance   Topic Date Due    ANNUAL REVIEW OF HM ORDERS  Never done    ZOSTER IMMUNIZATION (1 of 2) Never done    DTAP/TDAP/TD IMMUNIZATION (2 - Td or Tdap) 09/26/2022    COLORECTAL CANCER SCREENING  07/02/2023    RSV VACCINE (1 - 1-dose 75+ series) Never done    COVID-19 Vaccine (8 - 2024-25 season) 04/24/2025    BMP  05/02/2025    LIPID  05/02/2025    MEDICARE ANNUAL WELLNESS VISIT  05/02/2025    FALL RISK ASSESSMENT  05/13/2026    DIABETES SCREENING  05/02/2027    ADVANCE CARE PLANNING  05/02/2029    PHQ-2 (once per calendar year)  Completed    INFLUENZA VACCINE  Completed    Pneumococcal Vaccine: 50+ Years  Completed    HPV IMMUNIZATION  Aged Out    MENINGITIS IMMUNIZATION  Aged Out    HEPATITIS C SCREENING  Discontinued      Last vision screening (if done):       No data to display                The longitudinal plan of care for the diagnosis(es)/condition(s) as documented were addressed during this visit. Due to the added complexity in care, I will continue to support Oumar in the subsequent management and with ongoing continuity of care.    Tamika Sauceda, DNP, APRN, CNP   Electronically signed      Location Indication Override (Is Already Calculated Based On Selected Body Location): Area H

## 2025-05-13 NOTE — PROGRESS NOTES
Preventive Care Visit  [facility]  Tamika Sauceda NP, Internal Medicine  May 13, 2025  {Provider  Link to SmartSet :649942}    {PROVIDER CHARTING PREFERENCE:301600}    Aidan Oseguera is a 75 year old, presenting for the following:  Wellness Visit and Well Child        5/13/2025     1:47 PM   Additional Questions   Roomed by Elio ISRAEL MA     {ROOMER if patient is in their first year of Medicare a vision screen is required click here to document the Vison screen and then refresh the note to pull in results  :628098}      Well Child      ***   {MA/LPN/RN Pre-Provider Visit Orders- hCG/UA/Strep (Optional):764354}  {SUPERLIST (Optional):750619}  {additonal problems for provider to add (Optional):070860}  Advance Care Planning  {The storyboard will display whether the patient has ACP docs on file. Hover over the Code section in the storyboard to access the ACP documents. :958244}  {(AWV REQUIRED) Advance Care Planning Reviewed:086179}        5/13/2025   General Health   How would you rate your overall physical health? Good   Feel stress (tense, anxious, or unable to sleep) Not at all         5/13/2025   Nutrition   Diet: Regular (no restrictions)         5/13/2025   Exercise   Days per week of moderate/strenous exercise 0 days   Average minutes spent exercising at this level 0 min   (!) EXERCISE CONCERN      5/13/2025   Social Factors   Frequency of gathering with friends or relatives More than three times a week   Worry food won't last until get money to buy more No   Food not last or not have enough money for food? No   Do you have housing? (Housing is defined as stable permanent housing and does not include staying outside in a car, in a tent, in an abandoned building, in an overnight shelter, or couch-surfing.) Yes   Are you worried about losing your housing? No   Lack of transportation? No   Unable to get utilities (heat,electricity)? No         5/13/2025   Fall Risk   Fallen 2 or more times  in the past year? No   Trouble with walking or balance? No          5/13/2025   Activities of Daily Living- Home Safety   Needs help with the following daily activites None of the above   Safety concerns in the home None of the above         5/13/2025   Dental   Dentist two times every year? Yes         5/13/2025   Hearing Screening   Hearing concerns? None of the above         5/13/2025   Driving Risk Screening   Patient/family members have concerns about driving No         5/13/2025   General Alertness/Fatigue Screening   Have you been more tired than usual lately? No         5/13/2025   Urinary Incontinence Screening   Bothered by leaking urine in past 6 months No         Today's PHQ-2 Score:       5/13/2025     1:49 PM   PHQ-2 ( 1999 Pfizer)   Q1: Little interest or pleasure in doing things 0   Q2: Feeling down, depressed or hopeless 0   PHQ-2 Score 0   PHQ-2 Score Incomplete           5/13/2025   Substance Use   Alcohol more than 3/day or more than 7/wk No   Do you have a current opioid prescription? No   How severe/bad is pain from 1 to 10? 0/10 (No Pain)   Do you use any other substances recreationally? No     Social History     Tobacco Use    Smoking status: Never    Smokeless tobacco: Never   Vaping Use    Vaping status: Never Used   Substance Use Topics    Alcohol use: No    Drug use: No     {Provider  If there are gaps in the social history shown above, please follow the link to update and then refresh the note Link to Social and Substance History :687623}      5/13/2025   AAA Screening   Family history of Abdominal Aortic Aneurysm (AAA)? No   ASCVD Risk   The ASCVD Risk score (Artemio HOLLOWAY, et al., 2019) failed to calculate for the following reasons:    The valid total cholesterol range is 130 to 320 mg/dL    {Link to Fracture Risk Assessment Tool (Optional):030003}    {Provider  REQUIRED FOR AWV Use the storyboard to review patient history, after sections have been marked as reviewed, refresh  "note to capture documentation:596881}  {Provider   REQUIRED AWV use this link to review and update sexual activity history  after section has been marked as reviewed, refresh note to capture documentation:277487}  Reviewed and updated as needed this visit by Provider                    {HISTORY OPTIONS (Optional):950464}  Current providers sharing in care for this patient include:  Patient Care Team:  Tamika Sauceda NP as PCP - General  Tamika Sauceda NP as Assigned PCP    The following health maintenance items are reviewed in Epic and correct as of today:  Health Maintenance   Topic Date Due    ANNUAL REVIEW OF HM ORDERS  Never done    ZOSTER IMMUNIZATION (1 of 2) Never done    DTAP/TDAP/TD IMMUNIZATION (2 - Td or Tdap) 09/26/2022    COLORECTAL CANCER SCREENING  07/02/2023    RSV VACCINE (1 - 1-dose 75+ series) Never done    COVID-19 Vaccine (8 - 2024-25 season) 04/24/2025    BMP  05/02/2025    LIPID  05/02/2025    MEDICARE ANNUAL WELLNESS VISIT  05/02/2025    FALL RISK ASSESSMENT  05/13/2026    DIABETES SCREENING  05/02/2027    ADVANCE CARE PLANNING  05/02/2029    PHQ-2 (once per calendar year)  Completed    INFLUENZA VACCINE  Completed    Pneumococcal Vaccine: 50+ Years  Completed    HPV IMMUNIZATION  Aged Out    MENINGITIS IMMUNIZATION  Aged Out    HEPATITIS C SCREENING  Discontinued       {ROS Picklists (Optional):033403}     Objective    Exam  /66   Pulse 89   Temp 98.6  F (37  C) (Oral)   Resp 16   Ht 1.613 m (5' 3.5\")   Wt 94.8 kg (209 lb)   SpO2 96%   BMI 36.44 kg/m     Estimated body mass index is 36.44 kg/m  as calculated from the following:    Height as of this encounter: 1.613 m (5' 3.5\").    Weight as of this encounter: 94.8 kg (209 lb).    Physical Exam  {Exam Choices (Optional):070792}        5/13/2025   Mini Cog   Clock Draw Score 0 Abnormal   3 Item Recall 2 objects recalled   Mini Cog Total Score 2     {A Mini-Cog total score of 0-2 suggests the possibility of dementia, score of 3-5 " suggests no dementia:160903}         Signed Electronically by: Tamika Sauceda NP  {Email feedback regarding this note to [email or web address]   :304038}

## 2025-05-14 ENCOUNTER — RESULTS FOLLOW-UP (OUTPATIENT)
Dept: INTERNAL MEDICINE | Facility: CLINIC | Age: 75
End: 2025-05-14
Payer: COMMERCIAL

## 2025-05-14 DIAGNOSIS — D75.89 MACROCYTOSIS: Primary | ICD-10-CM

## 2025-05-14 LAB
ANION GAP SERPL CALCULATED.3IONS-SCNC: 11 MMOL/L (ref 7–15)
BUN SERPL-MCNC: 18.1 MG/DL (ref 8–23)
CALCIUM SERPL-MCNC: 9.8 MG/DL (ref 8.8–10.4)
CHLORIDE SERPL-SCNC: 102 MMOL/L (ref 98–107)
CHOLEST SERPL-MCNC: 145 MG/DL
CREAT SERPL-MCNC: 1.2 MG/DL (ref 0.67–1.17)
EGFRCR SERPLBLD CKD-EPI 2021: 63 ML/MIN/1.73M2
FASTING STATUS PATIENT QL REPORTED: NO
FASTING STATUS PATIENT QL REPORTED: NO
GLUCOSE SERPL-MCNC: 127 MG/DL (ref 70–99)
HCO3 SERPL-SCNC: 26 MMOL/L (ref 22–29)
HDLC SERPL-MCNC: 41 MG/DL
LDLC SERPL CALC-MCNC: 54 MG/DL
NONHDLC SERPL-MCNC: 104 MG/DL
POTASSIUM SERPL-SCNC: 4.6 MMOL/L (ref 3.4–5.3)
SODIUM SERPL-SCNC: 139 MMOL/L (ref 135–145)
TRIGL SERPL-MCNC: 248 MG/DL
VIT B12 SERPL-MCNC: 453 PG/ML (ref 232–1245)

## 2025-05-16 PROBLEM — Q84.5 ENLARGED AND HYPERTROPHIC NAILS: Status: ACTIVE | Noted: 2025-05-16

## 2025-08-13 ENCOUNTER — TELEPHONE (OUTPATIENT)
Dept: INTERNAL MEDICINE | Facility: CLINIC | Age: 75
End: 2025-08-13
Payer: COMMERCIAL

## 2025-08-13 DIAGNOSIS — I10 ESSENTIAL HYPERTENSION: Primary | ICD-10-CM

## 2025-08-13 RX ORDER — LOSARTAN POTASSIUM 100 MG/1
100 TABLET ORAL DAILY
Qty: 90 TABLET | Refills: 3 | Status: SHIPPED | OUTPATIENT
Start: 2025-08-13